# Patient Record
Sex: FEMALE | Race: WHITE | NOT HISPANIC OR LATINO | Employment: OTHER | URBAN - METROPOLITAN AREA
[De-identification: names, ages, dates, MRNs, and addresses within clinical notes are randomized per-mention and may not be internally consistent; named-entity substitution may affect disease eponyms.]

---

## 2024-03-28 LAB
CHOLESTEROL (EXTERNAL): 239 MG/DL (ref 0–200)
CREATININE (EXTERNAL): 0.8 MG/DL (ref 0.7–1.4)
GFR ESTIMATED (EXTERNAL): 69 ML/MIN/1.73M2
GLUCOSE (EXTERNAL): 99 MG/DL (ref 60–99)
HDLC SERPL-MCNC: 154 MG/DL (ref 35–65)
LDL CHOLESTEROL CALCULATED (EXTERNAL): 61 MG/DL (ref 0–130)
POTASSIUM (EXTERNAL): 5.2 MMOL/L (ref 3.5–5.1)
TRIGLYCERIDES (EXTERNAL): 120 MG/DL (ref 0–200)

## 2024-04-16 ENCOUNTER — TRANSFERRED RECORDS (OUTPATIENT)
Dept: HEALTH INFORMATION MANAGEMENT | Facility: CLINIC | Age: 79
End: 2024-04-16
Payer: COMMERCIAL

## 2024-04-16 LAB
ALT SERPL-CCNC: 18 U/L
AST SERPL-CCNC: 31 U/L (ref 14–36)
CREATININE (EXTERNAL): 0.8 MG/DL (ref 0.7–1.4)
GFR ESTIMATED (EXTERNAL): 69 ML/MIN/1.73M2
GLUCOSE (EXTERNAL): 109 MG/DL (ref 60–99)
POTASSIUM (EXTERNAL): 4.6 MMOL/L (ref 3.5–5.1)

## 2024-04-29 ENCOUNTER — TRANSFERRED RECORDS (OUTPATIENT)
Dept: HEALTH INFORMATION MANAGEMENT | Facility: CLINIC | Age: 79
End: 2024-04-29
Payer: COMMERCIAL

## 2024-05-14 ENCOUNTER — TRANSFERRED RECORDS (OUTPATIENT)
Dept: HEALTH INFORMATION MANAGEMENT | Facility: CLINIC | Age: 79
End: 2024-05-14
Payer: COMMERCIAL

## 2024-05-14 ENCOUNTER — MEDICAL CORRESPONDENCE (OUTPATIENT)
Dept: HEALTH INFORMATION MANAGEMENT | Facility: CLINIC | Age: 79
End: 2024-05-14
Payer: COMMERCIAL

## 2024-05-15 ENCOUNTER — TRANSCRIBE ORDERS (OUTPATIENT)
Dept: OTHER | Age: 79
End: 2024-05-15

## 2024-05-15 ENCOUNTER — PATIENT OUTREACH (OUTPATIENT)
Dept: ONCOLOGY | Facility: CLINIC | Age: 79
End: 2024-05-15
Payer: COMMERCIAL

## 2024-05-15 DIAGNOSIS — R22.30: Primary | ICD-10-CM

## 2024-05-15 DIAGNOSIS — C80.1 MALIGNANT SPINDLE CELL NEOPLASM (H): Primary | ICD-10-CM

## 2024-05-15 NOTE — PROGRESS NOTES
New Patient Hematology / Oncology Nurse Navigator Note     Referral Date: 5/15/24    Referring provider: Dr Kwan    Referring Clinic/Organization: [facility]     Referred to: Surgical Oncology and Medical Oncology    Requested provider (if applicable): Dr. 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 Looking at some virtual options and message sent to Dr Anna with hold placed on 5/28.       Records Location: Faxed - Media tab/Scanned     Records Needed:     See Pre-Visit encounter from CSS team for additional details on records collection. Additional questions on records needed for initial consult can be sent to P ONC ADULT NEW PATIENT RECORDS [[id number]] with a copy to  CANCER CARE NURSE NAVIGATION [[id number]]. Please include diagnosis and urgency in subject line.    Additional testing needed prior to consult:     Imaging likely needed, message to Dr Anna for orders    Referral updates and Plan:     Rosio is aware that we will begin coordinating the orders needed and have our intake team reach out to her with appointments.      Yara De Leon, NILTONN, RN, PHN, OCN  Hematology/Oncology Nurse Navigator   [facility]   [phone]   [email or web address]

## 2024-05-16 DIAGNOSIS — C80.1 MALIGNANT SPINDLE CELL NEOPLASM (H): Primary | ICD-10-CM

## 2024-05-16 DIAGNOSIS — C49.12 MALIGNANT NEOPLASM OF CONNECTIVE AND SOFT TISSUE OF LEFT UPPER LIMB, INCLUDING SHOULDER (H): ICD-10-CM

## 2024-05-23 ENCOUNTER — HOSPITAL ENCOUNTER (OUTPATIENT)
Dept: PET IMAGING | Facility: HOSPITAL | Age: 79
Discharge: HOME | End: 2024-05-23
Attending: INTERNAL MEDICINE
Payer: MEDICARE

## 2024-05-23 DIAGNOSIS — C80.1 MALIGNANT SPINDLE CELL NEOPLASM (H): ICD-10-CM

## 2024-05-23 DIAGNOSIS — C49.12 MALIGNANT NEOPLASM OF CONNECTIVE AND SOFT TISSUE OF LEFT UPPER LIMB, INCLUDING SHOULDER (H): ICD-10-CM

## 2024-05-23 LAB
CREAT BLD-MCNC: 0.8 MG/DL (ref 0.6–1.1)
EGFRCR SERPLBLD CKD-EPI 2021: >60 ML/MIN/1.73M2
GLUCOSE BLDC GLUCOMTR-MCNC: 96 MG/DL (ref 70–99)

## 2024-05-23 PROCEDURE — 82565 ASSAY OF CREATININE: CPT

## 2024-05-23 PROCEDURE — 71260 CT THORAX DX C+: CPT

## 2024-05-23 PROCEDURE — 78816 PET IMAGE W/CT FULL BODY: CPT | Mod: MG,PI

## 2024-05-23 PROCEDURE — 250N000011 HC RX IP 250 OP 636: Performed by: INTERNAL MEDICINE

## 2024-05-23 PROCEDURE — 82962 GLUCOSE BLOOD TEST: CPT

## 2024-05-23 PROCEDURE — 343N000001 HC RX 343: Performed by: INTERNAL MEDICINE

## 2024-05-23 PROCEDURE — A9552 F18 FDG: HCPCS | Performed by: INTERNAL MEDICINE

## 2024-05-23 RX ORDER — IOPAMIDOL 755 MG/ML
68 INJECTION, SOLUTION INTRAVASCULAR ONCE
Status: COMPLETED | OUTPATIENT
Start: 2024-05-23 | End: 2024-05-23

## 2024-05-23 RX ORDER — FLUDEOXYGLUCOSE F 18 200 MCI/ML
7-18 INJECTION, SOLUTION INTRAVENOUS ONCE
Status: COMPLETED | OUTPATIENT
Start: 2024-05-23 | End: 2024-05-23

## 2024-05-23 RX ADMIN — IOPAMIDOL 68 ML: 755 INJECTION, SOLUTION INTRAVENOUS at 12:35

## 2024-05-23 RX ADMIN — FLUDEOXYGLUCOSE F 18 10.18 MILLICURIE: 200 INJECTION, SOLUTION INTRAVENOUS at 11:38

## 2024-06-10 NOTE — PROGRESS NOTES
"[facility] CANCER CLINIC    NEW PATIENT VISIT NOTE    PATIENT NAME: Rosio Cunha MRN # [id number]  DATE OF VISIT: Haylie 10, 2024 YOB: 1945    REFERRING PROVIDER: Referred Self, MD  No address on file    CANCER TYPE: sarcoma?       CHIEF COMPLAIN        HISTORY OF PRESENT ILLNESS        PAST ONCOLOGIC HISTORY        PAST MEDICAL HISTORY       PAST SURGICAL HISTORY        FAMILY HISTORY        ALLERGIES    Not on File       SOCIAL HISTORY     Social History     Social History Narrative    Not on file           CURRENT OUTPATIENT MEDICATIONS     No current outpatient medications on file.          REVIEW OF SYSTEMS   As above in the HPI, o/w complete 12-point ROS was negative.     PHYSICAL EXAM   There were no vitals taken for this visit.    EGOG:  GEN: NAD  HEENT: PERRL, EOMI, no icterus, injection or pallor. Oropharynx is clear.  NECK: no cervical or supraclavicular lymphadenopathy  LUNGS: clear bilaterally  CV: regular, no murmurs, rubs, or gallops  ABDOMEN: soft, non-tender, non-distended, normal bowel sounds, no hepatosplenomegaly by percussion or palpation  EXT: warm, well perfused, no edema  NEURO: alert  SKIN: no rashes     LABORATORY AND IMAGING STUDIES     Recent Labs   Lab Test 05/23/24  1137 05/23/24  1130   CR 0.8  --    GLC  --  96     No results for input(s): \"WBC\", \"HGB\", \"PLT\", \"MCV\", \"NEUTROPHIL\" in the last 00800 hours.  No results for input(s): \"BILITOTAL\", \"ALKPHOS\", \"ALT\", \"AST\", \"ALBUMIN\", \"LDH\" in the last 24463 hours.  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   COMPARISON: None available at time of dictation  CONTRAST: 68 mm Isovue-370  TECHNIQUE: Serum glucose level 96 mg/dL. One hour post intravenous administration of 10.2 mCi F-18 FDG, PET imaging was performed from the skull vertex to feet, utilizing attenuation correction with concurrent axial CT and PET/CT image fusion. Separate   diagnostic CT of the chest, abdomen, and pelvis was performed. Dose reduction techniques were used.    PET/CT FINDINGS: Mildly FDG avid soft tissue thickening in the subcutaneous tissues of the right proximal upper extremity (Max SUV 3.1). The remaining FDG uptake is physiologic from the skull vertex to feet.    CT FINDINGS: Mild-to-moderate senescent intracranial changes. Postoperative changes of bilateral lenses. Mild paranasal sinus mucosal thickening. The paranasal tract and neck soft tissues are unremarkable. Four-vessel aortic arch. Moderate coronary   artery calcium. Visualized lungs are clear. Small fat-containing umbilical hernia. Sigmoid diverticulosis. Fibroid uterus. Pelvic phleboliths. Multilevel degenerative changes of the spine. The lower extremities are unremarkable      Impression    IMPRESSION:    Mildly FDG avid soft tissue thickening in the subcutaneous tissues of the right proximal upper extremity. While favored to represent post-excisional inflammatory change, the presence of in-situ neoplasm cannot be excluded           PATHOLOGY       I reviewed the medical records including ***, laboratory studies which are notable for ***, and have personally reviewed imaging including *** which demonstrates ***.   After an informed discussion, we have decided to proceed with ***.      ASSESSMENT AND PLAN           *** minutes spent on the date of the encounter doing {2021 E&M time in:288256}     Bala Rg MD   of Medicine  Hematology, Oncology and Transplantation

## 2024-06-10 NOTE — TELEPHONE ENCOUNTER
RECORDS STATUS - ALL OTHER DIAGNOSIS      RECORDS RECEIVED FROM: [facility], [facility], [facility], [facility]   NOTES STATUS DETAILS   OFFICE NOTE from referring provider External: [facility] Almendarez 05/14/24: Dr. Jerry Kwan   OPERATIVE REPORT External: [facility] 04/29/24: Excision mass right upper arm   MEDICATION LIST External: Jordan Almendarez    LABS     PATHOLOGY REPORTS Req 06/10-[facility]:  04/29/24: [id number]   ANYTHING RELATED TO DIAGNOSIS External: Jordan Almendarez Most recent 04/16/24   PATHOLOGY [facility] TRACKING   [facility] DIAG TRACKING #: [id number]   IMAGING (NEED IMAGES & REPORT)     CT SCANS PACS 05/23/24: CT CAP   MRI PACS 05/23/24: PET Onc Whole Body

## 2024-06-11 ENCOUNTER — LAB (OUTPATIENT)
Dept: LAB | Facility: CLINIC | Age: 79
End: 2024-06-11
Attending: STUDENT IN AN ORGANIZED HEALTH CARE EDUCATION/TRAINING PROGRAM
Payer: COMMERCIAL

## 2024-06-11 ENCOUNTER — PRE VISIT (OUTPATIENT)
Dept: ONCOLOGY | Facility: CLINIC | Age: 79
End: 2024-06-11
Payer: COMMERCIAL

## 2024-06-11 ENCOUNTER — ONCOLOGY VISIT (OUTPATIENT)
Dept: ONCOLOGY | Facility: CLINIC | Age: 79
End: 2024-06-11
Attending: STUDENT IN AN ORGANIZED HEALTH CARE EDUCATION/TRAINING PROGRAM
Payer: MEDICARE

## 2024-06-11 VITALS
WEIGHT: 128.7 LBS | RESPIRATION RATE: 16 BRPM | BODY MASS INDEX: 23.68 KG/M2 | OXYGEN SATURATION: 97 % | TEMPERATURE: 98 F | HEART RATE: 72 BPM | DIASTOLIC BLOOD PRESSURE: 90 MMHG | HEIGHT: 62 IN | SYSTOLIC BLOOD PRESSURE: 172 MMHG

## 2024-06-11 DIAGNOSIS — C80.1 MALIGNANT SPINDLE CELL NEOPLASM (H): ICD-10-CM

## 2024-06-11 LAB
ALBUMIN SERPL BCG-MCNC: 4.9 G/DL (ref 3.5–5.2)
ALP SERPL-CCNC: 78 U/L (ref 40–150)
ALT SERPL W P-5'-P-CCNC: 17 U/L (ref 0–50)
ANION GAP SERPL CALCULATED.3IONS-SCNC: 10 MMOL/L (ref 7–15)
AST SERPL W P-5'-P-CCNC: 25 U/L (ref 0–45)
BASOPHILS # BLD AUTO: 0.1 10E3/UL (ref 0–0.2)
BASOPHILS NFR BLD AUTO: 1 %
BILIRUB SERPL-MCNC: 0.7 MG/DL
BUN SERPL-MCNC: 15.6 MG/DL (ref 8–23)
CALCIUM SERPL-MCNC: 10.1 MG/DL (ref 8.8–10.2)
CHLORIDE SERPL-SCNC: 104 MMOL/L (ref 98–107)
CREAT SERPL-MCNC: 0.81 MG/DL (ref 0.51–0.95)
DEPRECATED HCO3 PLAS-SCNC: 26 MMOL/L (ref 22–29)
EGFRCR SERPLBLD CKD-EPI 2021: 73 ML/MIN/1.73M2
EOSINOPHIL # BLD AUTO: 0.1 10E3/UL (ref 0–0.7)
EOSINOPHIL NFR BLD AUTO: 1 %
ERYTHROCYTE [DISTWIDTH] IN BLOOD BY AUTOMATED COUNT: 13.5 % (ref 10–15)
GLUCOSE SERPL-MCNC: 106 MG/DL (ref 70–99)
HCT VFR BLD AUTO: 37.6 % (ref 35–47)
HGB BLD-MCNC: 12.5 G/DL (ref 11.7–15.7)
IMM GRANULOCYTES # BLD: 0 10E3/UL
IMM GRANULOCYTES NFR BLD: 0 %
LYMPHOCYTES # BLD AUTO: 1.7 10E3/UL (ref 0.8–5.3)
LYMPHOCYTES NFR BLD AUTO: 26 %
MCH RBC QN AUTO: 30.5 PG (ref 26.5–33)
MCHC RBC AUTO-ENTMCNC: 33.2 G/DL (ref 31.5–36.5)
MCV RBC AUTO: 92 FL (ref 78–100)
MONOCYTES # BLD AUTO: 0.6 10E3/UL (ref 0–1.3)
MONOCYTES NFR BLD AUTO: 9 %
NEUTROPHILS # BLD AUTO: 4.1 10E3/UL (ref 1.6–8.3)
NEUTROPHILS NFR BLD AUTO: 63 %
NRBC # BLD AUTO: 0 10E3/UL
NRBC BLD AUTO-RTO: 0 /100
PLATELET # BLD AUTO: 310 10E3/UL (ref 150–450)
POTASSIUM SERPL-SCNC: 4.1 MMOL/L (ref 3.4–5.3)
PROT SERPL-MCNC: 7.2 G/DL (ref 6.4–8.3)
RBC # BLD AUTO: 4.1 10E6/UL (ref 3.8–5.2)
SODIUM SERPL-SCNC: 140 MMOL/L (ref 135–145)
WBC # BLD AUTO: 6.5 10E3/UL (ref 4–11)

## 2024-06-11 PROCEDURE — 82040 ASSAY OF SERUM ALBUMIN: CPT

## 2024-06-11 PROCEDURE — 99205 OFFICE O/P NEW HI 60 MIN: CPT | Performed by: STUDENT IN AN ORGANIZED HEALTH CARE EDUCATION/TRAINING PROGRAM

## 2024-06-11 PROCEDURE — 36415 COLL VENOUS BLD VENIPUNCTURE: CPT

## 2024-06-11 PROCEDURE — G0463 HOSPITAL OUTPT CLINIC VISIT: HCPCS | Performed by: STUDENT IN AN ORGANIZED HEALTH CARE EDUCATION/TRAINING PROGRAM

## 2024-06-11 PROCEDURE — 85025 COMPLETE CBC W/AUTO DIFF WBC: CPT

## 2024-06-11 ASSESSMENT — PAIN SCALES - GENERAL: PAINLEVEL: NO PAIN (0)

## 2024-06-11 NOTE — NURSING NOTE
"Oncology Rooming Note    June 11, 2024 1:55 PM   Rosio Cunha is a 79 year old female who presents for:    Chief Complaint   Patient presents with    Oncology Clinic Visit     Malignant spindle cell neoplasm     Initial Vitals: BP (!) 172/90 (BP Location: Right arm, Patient Position: Sitting, Cuff Size: Adult Regular)   Pulse 72   Temp 98  F (36.7  C) (Oral)   Resp 16   Ht 1.573 m (5' 1.93\")   Wt 58.4 kg (128 lb 11.2 oz)   SpO2 97%   BMI 23.59 kg/m   Estimated body mass index is 23.59 kg/m  as calculated from the following:    Height as of this encounter: 1.573 m (5' 1.93\").    Weight as of this encounter: 58.4 kg (128 lb 11.2 oz). Body surface area is 1.6 meters squared.  No Pain (0) Comment: Data Unavailable   No LMP recorded. Patient is postmenopausal.  Allergies reviewed: Yes  Medications reviewed: Yes    Medications: Medication refills not needed today.  Pharmacy name entered into [employer]: [facility] [address] [facility] [address] [facility] [employer]    Frailty Screening:   Is the patient here for a new oncology consult visit in cancer care? 1. Yes. Over the past month, have you experienced difficulty or required a caregiver to assist with:   1. Balance, walking or general mobility (including any falls)? NO  2. Completion of self-care tasks such as bathing, dressing, toileting, grooming/hygiene?  NO  3. Concentration or memory that affects your daily life?  NO       Clinical concerns: none.       Cesar Reyes"

## 2024-06-11 NOTE — LETTER
"2024      Rosio Cunha  [address]  [place] [address]      Dear Colleague,    Thank you for referring your patient, Rosio Cunha, to the [facility]. Please see a copy of my visit note below.    [facility]    NEW PATIENT VISIT NOTE    PATIENT NAME: Rosio Cunha MRN # [id number]  DATE OF VISIT: 2024 YOB: 1945    REFERRING PROVIDER: Referred Self, MD  No address on file    CANCER TYPE: pending biopsy       CHIEF COMPLAIN   Healing arm surgical scan     HISTORY OF PRESENT ILLNESS   78 yo with no significant PMH. She reports that about 2 years she felt a small bump at the upper arm, she brought it up to her PCP and she was told that it was a cyst, it was originally removed on . It continued to grow and she underwent resection again in . The pathology was read as atypical spindle cell neoplasm extending to the borders and per report it was sent to [facility] for further characterization. I do not have the result from [place].     She comes here for further management. She has recovered well from surgery      PAST ONCOLOGIC HISTORY   None     PAST MEDICAL HISTORY   None    PAST SURGICAL HISTORY   None     FAMILY HISTORY   None     ALLERGIES    No Known Allergies       SOCIAL HISTORY     Social History     Social History Narrative    Not on file   Lives by herslef, has 3 children and all live nearby, walks every day,.  Comes with grandkae Koehler      CURRENT OUTPATIENT MEDICATIONS     No current outpatient medications on file.        REVIEW OF SYSTEMS   As above in the HPI, o/w complete 12-point ROS was negative.     PHYSICAL EXAM   BP (!) 172/90 (BP Location: Right arm, Patient Position: Sitting, Cuff Size: Adult Regular)   Pulse 72   Temp 98  F (36.7  C) (Oral)   Resp 16   Ht 1.573 m (5' 1.93\")   Wt 58.4 kg (128 lb 11.2 oz)   SpO2 97%   BMI 23.59 kg/m      EGO  GEN: NAD  HEENT: NIEVES MAURO, " no icterus, injection or pallor. Oropharynx is clear.  NECK: no cervical or supraclavicular lymphadenopathy  LUNGS: clear bilaterally  CV: regular, no murmurs, rubs, or gallops  ABDOMEN: soft, non-tender, non-distended, normal bowel sounds, no hepatosplenomegaly by percussion or palpation  EXT: warm, well perfused, no edema. R arm with an 8 cm surgical scar, well-healed.   NEURO: alert  SKIN: no rashes     LABORATORY AND IMAGING STUDIES     Lab Results   Component Value Date    WBC 6.5 06/11/2024     Lab Results   Component Value Date    RBC 4.10 06/11/2024     Lab Results   Component Value Date    HGB 12.5 06/11/2024     Lab Results   Component Value Date    HCT 37.6 06/11/2024     Lab Results   Component Value Date    MCV 92 06/11/2024     Lab Results   Component Value Date    MCH 30.5 06/11/2024     Lab Results   Component Value Date    MCHC 33.2 06/11/2024     Lab Results   Component Value Date    RDW 13.5 06/11/2024     Lab Results   Component Value Date     06/11/2024     Last Comprehensive Metabolic Panel:  Sodium   Date Value Ref Range Status   06/11/2024 140 135 - 145 mmol/L Final     Comment:     Reference intervals for this test were updated on 09/26/2023 to more accurately reflect our healthy population. There may be differences in the flagging of prior results with similar values performed with this method. Interpretation of those prior results can be made in the context of the updated reference intervals.      Potassium   Date Value Ref Range Status   06/11/2024 4.1 3.4 - 5.3 mmol/L Final     Chloride   Date Value Ref Range Status   06/11/2024 104 98 - 107 mmol/L Final     Carbon Dioxide (CO2)   Date Value Ref Range Status   06/11/2024 26 22 - 29 mmol/L Final     Anion Gap   Date Value Ref Range Status   06/11/2024 10 7 - 15 mmol/L Final     Glucose   Date Value Ref Range Status   06/11/2024 106 (H) 70 - 99 mg/dL Final     GLUCOSE BY METER POCT   Date Value Ref Range Status   05/23/2024 96 70 -  99 mg/dL Final     Urea Nitrogen   Date Value Ref Range Status   06/11/2024 15.6 8.0 - 23.0 mg/dL Final     Creatinine   Date Value Ref Range Status   06/11/2024 0.81 0.51 - 0.95 mg/dL Final     GFR Estimate   Date Value Ref Range Status   06/11/2024 73 >60 mL/min/1.73m2 Final     GFR, ESTIMATED POCT   Date Value Ref Range Status   05/23/2024 >60 >60 mL/min/1.73m2 Final     Calcium   Date Value Ref Range Status   06/11/2024 10.1 8.8 - 10.2 mg/dL Final     Bilirubin Total   Date Value Ref Range Status   06/11/2024 0.7 <=1.2 mg/dL Final     Alkaline Phosphatase   Date Value Ref Range Status   06/11/2024 78 40 - 150 U/L Final     ALT   Date Value Ref Range Status   06/11/2024 17 0 - 50 U/L Final     Comment:     Reference intervals for this test were updated on 6/12/2023 to more accurately reflect our healthy population. There may be differences in the flagging of prior results with similar values performed with this method. Interpretation of those prior results can be made in the context of the updated reference intervals.       AST   Date Value Ref Range Status   06/11/2024 25 0 - 45 U/L Final     Comment:     Reference intervals for this test were updated on 6/12/2023 to more accurately reflect our healthy population. There may be differences in the flagging of prior results with similar values performed with this method. Interpretation of those prior results can be made in the context of the updated reference intervals.       Results for orders placed or performed during the hospital encounter of 05/23/24   CT Chest/Abdomen/Pelvis w Contrast    Narrative    EXAM: PET ONCOLOGY WHOLE BODY, CT CHEST/ABDOMEN/PELVIS W CONTRAST  LOCATION: [facility]  DATE: 5/23/2024    INDICATION: Initial treatment planning and restaging for malignant neoplasm of connective and soft tissue of right upper limb, including shoulder.   COMPARISON: None available at time of dictation  CONTRAST: 68 mm  Isovue-370  TECHNIQUE: Serum glucose level 96 mg/dL. One hour post intravenous administration of 10.2 mCi F-18 FDG, PET imaging was performed from the skull vertex to feet, utilizing attenuation correction with concurrent axial CT and PET/CT image fusion. Separate   diagnostic CT of the chest, abdomen, and pelvis was performed. Dose reduction techniques were used.    PET/CT FINDINGS: Mildly FDG avid soft tissue thickening in the subcutaneous tissues of the right proximal upper extremity (Max SUV 3.1). The remaining FDG uptake is physiologic from the skull vertex to feet.    CT FINDINGS: Mild-to-moderate senescent intracranial changes. Postoperative changes of bilateral lenses. Mild paranasal sinus mucosal thickening. The paranasal tract and neck soft tissues are unremarkable. Four-vessel aortic arch. Moderate coronary   artery calcium. Visualized lungs are clear. Small fat-containing umbilical hernia. Sigmoid diverticulosis. Fibroid uterus. Pelvic phleboliths. Multilevel degenerative changes of the spine. The lower extremities are unremarkable      Impression    IMPRESSION:    Mildly FDG avid soft tissue thickening in the subcutaneous tissues of the right proximal upper extremity. 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    I reviewed the medical records including prior medical notes, laboratory studies which are notable for adequate renal and hepatic function, and normal blood counts. I have personally reviewed imaging including the most recent PET scan which demonstrates slight increase in FDG uptake at the site of the surgery, no clear evidence of gross residual tumor and no other concerning lesions.         ASSESSMENT AND PLAN     80 yo with no significant PMH. She reports that about 2 years she felt a small bump at the upper arm, she  brought it up to her PCP and she was told that it was a cyst, it was originally removed on 2019. It continued to grow and she underwent resection again in 4/292024. The pathology was read as atypical spindle cell neoplasm extending to the borders and per report it was sent to [facility] for further characterization. I do not have the result from [place].     I explained that we need to await for the final pathology result and our in house review in order to give further recommendations. I explained that in any case, she has localized disease and most likely will need re-resection. She will meet with Dr. Gillis in 2 days.   Pathology review has been requested.    Plan:  -Follow up Dr. Contreras in 2 weeks       60 minutes spent on the date of the encounter doing chart review, review of outside records, review of test results, interpretation of tests, patient visit, documentation, and discussion with other provider(s)     Bala Rg MD   of Medicine  Hematology, Oncology and Transplantation

## 2024-06-11 NOTE — PROGRESS NOTES
"[facility] CANCER CLINIC    NEW PATIENT VISIT NOTE    PATIENT NAME: Rosio Cunha MRN # [id number]  DATE OF VISIT: 2024 YOB: 1945    REFERRING PROVIDER: Referred Self, MD  No address on file    CANCER TYPE: pending biopsy       CHIEF COMPLAIN   Healing arm surgical scan     HISTORY OF PRESENT ILLNESS   78 yo with no significant PMH. She reports that about 2 years she felt a small bump at the upper arm, she brought it up to her PCP and she was told that it was a cyst, it was originally removed on 2019. It continued to grow and she underwent resection again in . 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 I do not have the result from [place].     She comes here for further management. She has recovered well from surgery      PAST ONCOLOGIC HISTORY   None     PAST MEDICAL HISTORY   None    PAST SURGICAL HISTORY   None     FAMILY HISTORY   None     ALLERGIES    No Known Allergies       SOCIAL HISTORY     Social History     Social History Narrative    Not on file   Lives by herslef, has 3 children and all live nearby, walks every day,.  Comes with sarai Koehler      CURRENT OUTPATIENT MEDICATIONS     No current outpatient medications on file.        REVIEW OF SYSTEMS   As above in the HPI, o/w complete 12-point ROS was negative.     PHYSICAL EXAM   BP (!) 172/90 (BP Location: Right arm, Patient Position: Sitting, Cuff Size: Adult Regular)   Pulse 72   Temp 98  F (36.7  C) (Oral)   Resp 16   Ht 1.573 m (5' 1.93\")   Wt 58.4 kg (128 lb 11.2 oz)   SpO2 97%   BMI 23.59 kg/m      EGO  GEN: NAD  HEENT: PERRL, EOMI, no icterus, injection or pallor. Oropharynx is clear.  NECK: no cervical or supraclavicular lymphadenopathy  LUNGS: clear bilaterally  CV: regular, no murmurs, rubs, or gallops  ABDOMEN: soft, non-tender, non-distended, normal bowel sounds, no " hepatosplenomegaly by percussion or palpation  EXT: warm, well perfused, no edema. R arm with an 8 cm surgical scar, well-healed.   NEURO: alert  SKIN: no rashes     LABORATORY AND IMAGING STUDIES     Lab Results   Component Value Date    WBC 6.5 06/11/2024     Lab Results   Component Value Date    RBC 4.10 06/11/2024     Lab Results   Component Value Date    HGB 12.5 06/11/2024     Lab Results   Component Value Date    HCT 37.6 06/11/2024     Lab Results   Component Value Date    MCV 92 06/11/2024     Lab Results   Component Value Date    MCH 30.5 06/11/2024     Lab Results   Component Value Date    MCHC 33.2 06/11/2024     Lab Results   Component Value Date    RDW 13.5 06/11/2024     Lab Results   Component Value Date     06/11/2024     Last Comprehensive Metabolic Panel:  Sodium   Date Value Ref Range Status   06/11/2024 140 135 - 145 mmol/L Final     Comment:     Reference intervals for this test were updated on 09/26/2023 to more accurately reflect our healthy population. There may be differences in the flagging of prior results with similar values performed with this method. Interpretation of those prior results can be made in the context of the updated reference intervals.      Potassium   Date Value Ref Range Status   06/11/2024 4.1 3.4 - 5.3 mmol/L Final     Chloride   Date Value Ref Range Status   06/11/2024 104 98 - 107 mmol/L Final     Carbon Dioxide (CO2)   Date Value Ref Range Status   06/11/2024 26 22 - 29 mmol/L Final     Anion Gap   Date Value Ref Range Status   06/11/2024 10 7 - 15 mmol/L Final     Glucose   Date Value Ref Range Status   06/11/2024 106 (H) 70 - 99 mg/dL Final     GLUCOSE BY METER POCT   Date Value Ref Range Status   05/23/2024 96 70 - 99 mg/dL Final     Urea Nitrogen   Date Value Ref Range Status   06/11/2024 15.6 8.0 - 23.0 mg/dL Final     Creatinine   Date Value Ref Range Status   06/11/2024 0.81 0.51 - 0.95 mg/dL Final     GFR Estimate   Date Value Ref Range Status    06/11/2024 73 >60 mL/min/1.73m2 Final     GFR, ESTIMATED POCT   Date Value Ref Range Status   05/23/2024 >60 >60 mL/min/1.73m2 Final     Calcium   Date Value Ref Range Status   06/11/2024 10.1 8.8 - 10.2 mg/dL Final     Bilirubin Total   Date Value Ref Range Status   06/11/2024 0.7 <=1.2 mg/dL Final     Alkaline Phosphatase   Date Value Ref Range Status   06/11/2024 78 40 - 150 U/L Final     ALT   Date Value Ref Range Status   06/11/2024 17 0 - 50 U/L Final     Comment:     Reference intervals for this test were updated on 6/12/2023 to more accurately reflect our healthy population. There may be differences in the flagging of prior results with similar values performed with this method. Interpretation of those prior results can be made in the context of the updated reference intervals.       AST   Date Value Ref Range Status   06/11/2024 25 0 - 45 U/L Final     Comment:     Reference intervals for this test were updated on 6/12/2023 to more accurately reflect our healthy population. There may be differences in the flagging of prior results with similar values performed with this method. Interpretation of those prior results can be made in the context of the updated reference intervals.       Results for orders placed or performed during the hospital encounter of 05/23/24   CT Chest/Abdomen/Pelvis w Contrast    Narrative    EXAM: PET ONCOLOGY WHOLE BODY, CT CHEST/ABDOMEN/PELVIS W CONTRAST  LOCATION: 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   COMPARISON: None available at time of dictation  CONTRAST: 68 mm [id number]  TECHNIQUE: Serum glucose level 96 mg/dL. One hour post intravenous administration of 10.2 mCi F-18 FDG, PET imaging was performed from the skull vertex to feet, utilizing attenuation correction with concurrent axial CT and PET/CT image fusion.  Separate   diagnostic CT of the chest, abdomen, and pelvis was performed. Dose reduction techniques were used.    PET/CT FINDINGS: Mildly FDG avid soft tissue thickening in the subcutaneous tissues of the right proximal upper extremity (Max SUV 3.1). The remaining FDG uptake is physiologic from the skull vertex to feet.    CT FINDINGS: Mild-to-moderate senescent intracranial changes. Postoperative changes of bilateral lenses. Mild paranasal sinus mucosal thickening. The paranasal tract and neck soft tissues are unremarkable. Four-vessel aortic arch. Moderate coronary   artery calcium. Visualized lungs are clear. Small fat-containing umbilical hernia. Sigmoid diverticulosis. Fibroid uterus. Pelvic phleboliths. Multilevel degenerative changes of the spine. The lower extremities are unremarkable      Impression    IMPRESSION:    Mildly FDG avid soft tissue thickening in the subcutaneous tissues of the right proximal upper extremity. 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    I reviewed the medical records including prior medical notes, laboratory studies which are notable for adequate renal and hepatic function, and normal blood counts. I have personally reviewed imaging including the most recent PET scan which demonstrates slight increase in FDG uptake at the site of the surgery, no clear evidence of gross residual tumor and no other concerning lesions.         ASSESSMENT AND PLAN     78 yo with no significant PMH. She reports that about 2 years she felt a small bump at the upper arm, she brought it up to her PCP and she was told that it was a cyst, it was originally removed on 2019. It continued to grow and she underwent resection again in 4/292024. 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 I do not have the result from [place].     I explained that we need to await for the final pathology result and our in house review in order to give further recommendations. I explained that in any case, she has localized disease and most likely will need re-resection. She will meet with Dr. Gillis in 2 days.   Pathology review has been requested.    Plan:  -Follow up Dr. Contreras in 2 weeks       60 minutes spent on the date of the encounter doing chart review, review of outside records, review of test results, interpretation of tests, patient visit, documentation, and discussion with other provider(s)     Bala Rg MD   of Medicine  Hematology, Oncology and Transplantation

## 2024-06-14 ENCOUNTER — ONCOLOGY VISIT (OUTPATIENT)
Dept: ONCOLOGY | Facility: CLINIC | Age: 79
End: 2024-06-14
Attending: SURGERY
Payer: MEDICARE

## 2024-06-14 VITALS
WEIGHT: 129.4 LBS | TEMPERATURE: 97.7 F | RESPIRATION RATE: 16 BRPM | BODY MASS INDEX: 24.43 KG/M2 | HEART RATE: 67 BPM | DIASTOLIC BLOOD PRESSURE: 69 MMHG | OXYGEN SATURATION: 98 % | SYSTOLIC BLOOD PRESSURE: 174 MMHG | HEIGHT: 61 IN

## 2024-06-14 DIAGNOSIS — C49.12 MALIGNANT NEOPLASM OF CONNECTIVE AND SOFT TISSUE OF LEFT UPPER LIMB, INCLUDING SHOULDER (H): Primary | ICD-10-CM

## 2024-06-14 PROCEDURE — G0463 HOSPITAL OUTPT CLINIC VISIT: HCPCS | Performed by: SURGERY

## 2024-06-14 PROCEDURE — 99202 OFFICE O/P NEW SF 15 MIN: CPT | Performed by: SURGERY

## 2024-06-14 ASSESSMENT — PAIN SCALES - GENERAL: PAINLEVEL: NO PAIN (0)

## 2024-06-14 NOTE — LETTER
6/14/2024      Rosio Cunha  [address]  [facility] [place] [facility] [place] [address]      Dear Colleague,    Thank you for referring your patient, Rosio Cunha, to the [facility]. Please see a copy of my visit note below.    Patient is a 79-year-old woman who has an abnormal skin biopsy of her right shoulder.  She states that about 3 years ago she had a cyst on her right arm.  She states that she underwent an excision of that cyst in the doctor's office who threw the specimen away.  She states then at the wound never healed correctly and so she saw another surgeon in April who performed a wide excision of the previous excision site.  This specimen was sent to pathology and was reviewed and was considered an atypical spindle cell neoplasm with positive margins.  The slides have subsequently been sent to the [facility].  The results from the review are not back she was told that she had cancer and so she had a PET CT scan which did not show any abnormalities.  Other than the expected skin changes.  On physical examination she has a 8 cm incision in her right shoulder with no evidence of pigmented skin changes and no palpable masses.    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  We will await the reports from [facility].  If she does have an atypical skin lesion with positive margins that I would recommend reexcision.  Will set up a phone visit in approximately 2 weeks with her to discuss the final recommendations.    The total time was 20 minutes which included reviewing her imaging and the in person visit    Sincerely,        Moi Gillis MD

## 2024-06-14 NOTE — PROGRESS NOTES
Patient is a 79-year-old woman who has an abnormal skin biopsy of her right shoulder.  She states that about 3 years ago she had a cyst on her right arm.  She states that she underwent an excision of that cyst in the doctor's office who threw the specimen away.  She states then at the wound never healed correctly and so she saw another surgeon in April who performed a wide excision of the previous excision site.  This specimen was sent to pathology and was reviewed and was considered an atypical spindle cell neoplasm with positive margins.  The slides have subsequently been sent to the [facility].  The results from the review are not back she was told that she had cancer and so she had a PET CT scan which did not show any abnormalities.  Other than the expected skin changes.  On physical examination she has a 8 cm incision in her right shoulder with no evidence of pigmented skin changes and no palpable masses.    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  We will await the reports from [facility].  If she does have an atypical skin lesion with positive margins that I would recommend reexcision.  Will set up a phone visit in approximately 2 weeks with her to discuss the final recommendations.    The total time was 20 minutes which included reviewing her imaging and the in person visit

## 2024-06-14 NOTE — NURSING NOTE
"Oncology Rooming Note    June 14, 2024 10:12 AM   Rosio Cunha is a 79 year old female who presents for:    Chief Complaint   Patient presents with    Oncology Clinic Visit     New Eval for Malignant Spindle Cell neoplasm     Initial Vitals: BP (!) 174/69   Pulse 67   Temp 97.7  F (36.5  C) (Oral)   Resp 16   Ht 1.549 m (5' 1\")   Wt 58.7 kg (129 lb 6.4 oz)   SpO2 98%   BMI 24.45 kg/m   Estimated body mass index is 24.45 kg/m  as calculated from the following:    Height as of this encounter: 1.549 m (5' 1\").    Weight as of this encounter: 58.7 kg (129 lb 6.4 oz). Body surface area is 1.59 meters squared.  No Pain (0) Comment: Data Unavailable   No LMP recorded. Patient is postmenopausal.  Allergies reviewed: Yes  Medications reviewed: Yes    Medications: Medication refills not needed today.  Pharmacy name entered into [employer]: [facility] [address] [facility] [address]    Frailty Screening:   Is the patient here for a new oncology consult visit in cancer care? 2. No      Clinical concerns:        Jyoti Kemp MA             "

## 2024-06-18 NOTE — TELEPHONE ENCOUNTER
Action    Action Taken 6/18/24  Slides from [employer] received, sent to 5th floor lab @ [facility]  12:00 PM

## 2024-06-20 ENCOUNTER — LAB REQUISITION (OUTPATIENT)
Dept: LAB | Facility: CLINIC | Age: 79
End: 2024-06-20
Payer: COMMERCIAL

## 2024-06-20 LAB
PATH REPORT.COMMENTS IMP SPEC: ABNORMAL
PATH REPORT.COMMENTS IMP SPEC: YES
PATH REPORT.FINAL DX SPEC: ABNORMAL
PATH REPORT.GROSS SPEC: ABNORMAL
PATH REPORT.MICROSCOPIC SPEC OTHER STN: ABNORMAL
PATH REPORT.RELEVANT HX SPEC: ABNORMAL
PATH REPORT.RELEVANT HX SPEC: ABNORMAL
PATH REPORT.SITE OF ORIGIN SPEC: ABNORMAL

## 2024-06-20 PROCEDURE — 88321 CONSLTJ&REPRT SLD PREP ELSWR: CPT | Performed by: DERMATOLOGY

## 2024-06-24 NOTE — PROGRESS NOTES
Virtual Visit Details    Type of service:  Video Visit   Video Start Time: 5:30 PM  Video End Time:5:52 PM    Originating Location (pt. Location): Home    Distant Location (provider location):  On-site  Platform used for Video Visit: [facility] CANCER CLINIC    FOLLOW UP VISIT NOTE    PATIENT NAME: Rosio Cunha MRN # [id number]  DATE OF VISIT: 2024 YOB: 1945    REFERRING PROVIDER: Referred Self, MD  No address on file    CANCER TYPE:   High grade leiomyosarcoma       CHIEF COMPLAIN   Healing arm surgical scan     HISTORY OF PRESENT ILLNESS   80 yo with no significant PMH. She reports that about 2 years she felt a small bump at the upper arm, she brought it up to her PCP and she was told that it was a cyst, it was originally removed on . It continued to grow and she underwent resection again in . 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 I do not have the result from [place].     She comes here for further management. She has recovered well from surgery     Interval Hx  She feels well, no new symptoms     PAST ONCOLOGIC HISTORY   None     PAST MEDICAL HISTORY   None    PAST SURGICAL HISTORY   None     FAMILY HISTORY   None     ALLERGIES    No Known Allergies       SOCIAL HISTORY     Social History     Social History Narrative    Not on file   Lives by herslef, has 3 children and all live nearby, walks every day,.  Comes with grandkae Koehler      CURRENT OUTPATIENT MEDICATIONS     No current outpatient medications on file.        REVIEW OF SYSTEMS   As above in the HPI, o/w complete 12-point ROS was negative.     PHYSICAL EXAM   There were no vitals taken for this visit.    Virtual visit only    Prior medical exam    EGO  GEN: NAD  HEENT: PERRL, EOMI, no icterus, injection or pallor. Oropharynx is clear.  NECK: no cervical or  supraclavicular lymphadenopathy  LUNGS: clear bilaterally  CV: regular, no murmurs, rubs, or gallops  ABDOMEN: soft, non-tender, non-distended, normal bowel sounds, no hepatosplenomegaly by percussion or palpation  EXT: warm, well perfused, no edema. R arm with an 8 cm surgical scar, well-healed.   NEURO: alert  SKIN: no rashes     LABORATORY AND IMAGING STUDIES     Lab Results   Component Value Date    WBC 6.5 06/11/2024     Lab Results   Component Value Date    RBC 4.10 06/11/2024     Lab Results   Component Value Date    HGB 12.5 06/11/2024     Lab Results   Component Value Date    HCT 37.6 06/11/2024     Lab Results   Component Value Date    MCV 92 06/11/2024     Lab Results   Component Value Date    MCH 30.5 06/11/2024     Lab Results   Component Value Date    MCHC 33.2 06/11/2024     Lab Results   Component Value Date    RDW 13.5 06/11/2024     Lab Results   Component Value Date     06/11/2024     Last Comprehensive Metabolic Panel:  Sodium   Date Value Ref Range Status   06/11/2024 140 135 - 145 mmol/L Final     Comment:     Reference intervals for this test were updated on 09/26/2023 to more accurately reflect our healthy population. There may be differences in the flagging of prior results with similar values performed with this method. Interpretation of those prior results can be made in the context of the updated reference intervals.      Potassium   Date Value Ref Range Status   06/11/2024 4.1 3.4 - 5.3 mmol/L Final     Chloride   Date Value Ref Range Status   06/11/2024 104 98 - 107 mmol/L Final     Carbon Dioxide (CO2)   Date Value Ref Range Status   06/11/2024 26 22 - 29 mmol/L Final     Anion Gap   Date Value Ref Range Status   06/11/2024 10 7 - 15 mmol/L Final     Glucose   Date Value Ref Range Status   06/11/2024 106 (H) 70 - 99 mg/dL Final     GLUCOSE BY METER POCT   Date Value Ref Range Status   05/23/2024 96 70 - 99 mg/dL Final     Urea Nitrogen   Date Value Ref Range Status   06/11/2024  15.6 8.0 - 23.0 mg/dL Final     Creatinine   Date Value Ref Range Status   06/11/2024 0.81 0.51 - 0.95 mg/dL Final     GFR Estimate   Date Value Ref Range Status   06/11/2024 73 >60 mL/min/1.73m2 Final     GFR, ESTIMATED POCT   Date Value Ref Range Status   05/23/2024 >60 >60 mL/min/1.73m2 Final     Calcium   Date Value Ref Range Status   06/11/2024 10.1 8.8 - 10.2 mg/dL Final     Bilirubin Total   Date Value Ref Range Status   06/11/2024 0.7 <=1.2 mg/dL Final     Alkaline Phosphatase   Date Value Ref Range Status   06/11/2024 78 40 - 150 U/L Final     ALT   Date Value Ref Range Status   06/11/2024 17 0 - 50 U/L Final     Comment:     Reference intervals for this test were updated on 6/12/2023 to more accurately reflect our healthy population. There may be differences in the flagging of prior results with similar values performed with this method. Interpretation of those prior results can be made in the context of the updated reference intervals.       AST   Date Value Ref Range Status   06/11/2024 25 0 - 45 U/L Final     Comment:     Reference intervals for this test were updated on 6/12/2023 to more accurately reflect our healthy population. There may be differences in the flagging of prior results with similar values performed with this method. Interpretation of those prior results can be made in the context of the updated reference intervals.       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   COMPARISON: None available at time of dictation  CONTRAST: 68 mm Isovue-370  TECHNIQUE: Serum glucose level 96 mg/dL. One hour post intravenous administration  of 10.2 mCi F-18 FDG, PET imaging was performed from the skull vertex to feet, utilizing attenuation correction with concurrent axial CT and PET/CT image fusion. Separate   diagnostic CT of the chest, abdomen, and pelvis was performed. Dose reduction techniques were used.    PET/CT FINDINGS: Mildly FDG avid soft tissue thickening in the subcutaneous tissues of the right proximal upper extremity (Max SUV 3.1). The remaining FDG uptake is physiologic from the skull vertex to feet.    CT FINDINGS: Mild-to-moderate senescent intracranial changes. Postoperative changes of bilateral lenses. Mild paranasal sinus mucosal thickening. The paranasal tract and neck soft tissues are unremarkable. Four-vessel aortic arch. Moderate coronary   artery calcium. Visualized lungs are clear. Small fat-containing umbilical hernia. Sigmoid diverticulosis. Fibroid uterus. Pelvic phleboliths. Multilevel degenerative changes of the spine. The lower extremities are unremarkable      Impression    IMPRESSION:    Mildly FDG avid soft tissue thickening in the subcutaneous tissues of the right proximal upper extremity. While favored to represent post-excisional inflammatory change, the presence of in-situ neoplasm cannot be excluded           PATHOLOGY     Final Diagnosis   [place] FROM [place] ([id number], OBTAINED 04/29/2024):  Skin, right arm, excision:  - Malignant spindle cell neoplasm, extending to the deep margin - (see comment and description)   Electronically signed by Jacky Mcclain MD on 6/20/2024 at  1:59 PM   Comment    This case was reviewed in conjunction with review of the report from Dr. Gerald Rowe at [facility].  I agree with the interpretation this neoplasm represents a dual phase malignant neoplasm which superficially resembles a cutaneous atypical smooth muscle neoplasm (previously referred to as cutaneous leiomyosarcoma) which devolves in the deeper aspects of the specimen into a poorly  differentiated leiomyosarcoma with zones of anaplastic growth.  While cutaneous atypical smooth muscle neoplasm has been documented to have generally indolent clinical behavior, I share concerns that the dedifferentiated and anaplastic areas of this tumor make aggressive growth significantly more likely.  As this lesion extends to the deep specimen margin, a reexcision is recommended at this site (alongside additional workup/staging).     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    I reviewed the medical records including prior medical notes, laboratory studies which are notable for adequate renal and hepatic function, and normal blood counts. I have personally reviewed imaging including the most recent PET scan which demonstrates slight increase in FDG uptake at the site of the surgery, no clear evidence of gross residual tumor and no other concerning lesions.         ASSESSMENT AND PLAN     80 yo with no significant PMH. She reports that about 2 years she felt a small bump at the upper arm, she brought it up to her PCP and she was told that it was a cyst, it was originally removed on 2019. It continued to grow and she underwent resection again in 4/292024. 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 I do not have the result from [place]. The full specimen including the skin was up to 5.5 cm, but no record of the size of the tumor in the pathology reports.    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     I discussed with the patient that sarcomas are rare tumors only representing 1% of all cancers and that they are very heterogeneous with over 100 different subtypes. When sarcomas are localized disease they main approach to therapy is surgical resection. However, there are factors  that influence the chances of recurrence and development of distant metastasis. In general tumors that are big in size (over 5 cm) and high grade (rapid cell division observed under the microscope) have higher probability of recurrence.     In her case, the tumor seems to be small, and currently there is no measurable disease. I recommend to proceed with re-resection, followed by close surveillance. She is meeting with Dr. Gillis tomorrow.     I explained that she will need local imaging and CT chest every 3-4 months during the first few years after resection.     She had a lot of trouble understanding my accent during the virtual visit and I had to type on the chat most of our conversation. I will request transfer care to my colleague Dr. Anna for surveillance.       Plan:  - MRI R arm and CT chest at the end of september  - follow up Dr Anna after scans      40 minutes spent on the date of the encounter doing chart review, review of outside records, review of test results, interpretation of tests, patient visit, documentation, and discussion with other provider(s)     Bala Rg MD   of Medicine  Hematology, Oncology and Transplantation

## 2024-06-26 ENCOUNTER — VIRTUAL VISIT (OUTPATIENT)
Dept: ONCOLOGY | Facility: CLINIC | Age: 79
End: 2024-06-26
Attending: STUDENT IN AN ORGANIZED HEALTH CARE EDUCATION/TRAINING PROGRAM
Payer: COMMERCIAL

## 2024-06-26 DIAGNOSIS — C49.9 LEIOMYOSARCOMA (H): Primary | ICD-10-CM

## 2024-06-26 DIAGNOSIS — C80.1 MALIGNANT SPINDLE CELL NEOPLASM (H): ICD-10-CM

## 2024-06-26 PROCEDURE — 99215 OFFICE O/P EST HI 40 MIN: CPT | Mod: 95 | Performed by: STUDENT IN AN ORGANIZED HEALTH CARE EDUCATION/TRAINING PROGRAM

## 2024-06-26 NOTE — NURSING NOTE
Is the patient currently in the state of [place]? YES    Visit mode:VIDEO    If the visit is dropped, the patient can be reconnected by: VIDEO VISIT: Text to cell phone:   Telephone Information:   Mobile [phone]       Will anyone else be joining the visit? NO  (If patient encounters technical issues they should call [phone])    How would you like to obtain your AVS? MyChart    Are changes needed to the allergy or medication list? No    Are refills needed on medications prescribed by this physician? NO    Reason for visit: RECHECK    Annie ANNE

## 2024-06-26 NOTE — LETTER
6/26/2024      Rosio Cunha  [address]  [place] [address]      Dear Colleague,    Thank you for referring your patient, Rosio Cunha, to the [facility] CANCER CLINIC. Please see a copy of my visit note below.    Virtual Visit Details    Type of service:  Video Visit   Video Start Time: 5:30 PM  Video End Time:5:52 PM    Originating Location (pt. Location): Home    Distant Location (provider location):  On-site  Platform used for Video Visit: [facility] CANCER CLINIC    FOLLOW UP VISIT NOTE    PATIENT NAME: Rosio Cunha MRN # [id number]  DATE OF VISIT: June 26, 2024 YOB: 1945    REFERRING PROVIDER: Referred Self, MD  No address on file    CANCER TYPE:   High grade leiomyosarcoma       CHIEF COMPLAIN   Healing arm surgical scan     HISTORY OF PRESENT ILLNESS   80 yo with no significant PMH. She reports that about 2 years she felt a small bump at the upper arm, she brought it up to her PCP and she was told that it was a cyst, it was originally removed on 2019. It continued to grow and she underwent resection again in 4/292024. The pathology was read as atypical spindle cell neoplasm extending to the borders and per report it was sent to [facility] for further characterization. I do not have the result from [place].     She comes here for further management. She has recovered well from surgery     Interval Hx  She feels well, no new symptoms     PAST ONCOLOGIC HISTORY   None     PAST MEDICAL HISTORY   None    PAST SURGICAL HISTORY   None     FAMILY HISTORY   None     ALLERGIES    No Known Allergies       SOCIAL HISTORY     Social History     Social History Narrative     Not on file   Lives by herslef, has 3 children and all live nearby, walks every day,.  Comes with grandkae Koehler      CURRENT OUTPATIENT MEDICATIONS     No current outpatient medications on file.        REVIEW OF SYSTEMS   As above in the HPI, o/w complete 12-point ROS was  negative.     PHYSICAL EXAM   There were no vitals taken for this visit.    Virtual visit only    Prior medical exam    EGO  GEN: NAD  HEENT: PERRL, EOMI, no icterus, injection or pallor. Oropharynx is clear.  NECK: no cervical or supraclavicular lymphadenopathy  LUNGS: clear bilaterally  CV: regular, no murmurs, rubs, or gallops  ABDOMEN: soft, non-tender, non-distended, normal bowel sounds, no hepatosplenomegaly by percussion or palpation  EXT: warm, well perfused, no edema. R arm with an 8 cm surgical scar, well-healed.   NEURO: alert  SKIN: no rashes     LABORATORY AND IMAGING STUDIES     Lab Results   Component Value Date    WBC 6.5 2024     Lab Results   Component Value Date    RBC 4.10 2024     Lab Results   Component Value Date    HGB 12.5 2024     Lab Results   Component Value Date    HCT 37.6 2024     Lab Results   Component Value Date    MCV 92 2024     Lab Results   Component Value Date    MCH 30.5 2024     Lab Results   Component Value Date    MCHC 33.2 2024     Lab Results   Component Value Date    RDW 13.5 2024     Lab Results   Component Value Date     2024     Last Comprehensive Metabolic Panel:  Sodium   Date Value Ref Range Status   2024 140 135 - 145 mmol/L Final     Comment:     Reference intervals for this test were updated on 2023 to more accurately reflect our healthy population. There may be differences in the flagging of prior results with similar values performed with this method. Interpretation of those prior results can be made in the context of the updated reference intervals.      Potassium   Date Value Ref Range Status   2024 4.1 3.4 - 5.3 mmol/L Final     Chloride   Date Value Ref Range Status   2024 104 98 - 107 mmol/L Final     Carbon Dioxide (CO2)   Date Value Ref Range Status   2024 26 22 - 29 mmol/L Final     Anion Gap   Date Value Ref Range Status   2024 10 7 - 15 mmol/L Final      Glucose   Date Value Ref Range Status   06/11/2024 106 (H) 70 - 99 mg/dL Final     GLUCOSE BY METER POCT   Date Value Ref Range Status   05/23/2024 96 70 - 99 mg/dL Final     Urea Nitrogen   Date Value Ref Range Status   06/11/2024 15.6 8.0 - 23.0 mg/dL Final     Creatinine   Date Value Ref Range Status   06/11/2024 0.81 0.51 - 0.95 mg/dL Final     GFR Estimate   Date Value Ref Range Status   06/11/2024 73 >60 mL/min/1.73m2 Final     GFR, ESTIMATED POCT   Date Value Ref Range Status   05/23/2024 >60 >60 mL/min/1.73m2 Final     Calcium   Date Value Ref Range Status   06/11/2024 10.1 8.8 - 10.2 mg/dL Final     Bilirubin Total   Date Value Ref Range Status   06/11/2024 0.7 <=1.2 mg/dL Final     Alkaline Phosphatase   Date Value Ref Range Status   06/11/2024 78 40 - 150 U/L Final     ALT   Date Value Ref Range Status   06/11/2024 17 0 - 50 U/L Final     Comment:     Reference intervals for this test were updated on 6/12/2023 to more accurately reflect our healthy population. There may be differences in the flagging of prior results with similar values performed with this method. Interpretation of those prior results can be made in the context of the updated reference intervals.       AST   Date Value Ref Range Status   06/11/2024 25 0 - 45 U/L Final     Comment:     Reference intervals for this test were updated on 6/12/2023 to more accurately reflect our healthy population. There may be differences in the flagging of prior results with similar values performed with this method. Interpretation of those prior results can be made in the context of the updated reference intervals.       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   COMPARISON: None available at time of dictation  CONTRAST: 68 mm Isovue-370  TECHNIQUE: Serum glucose level 96 mg/dL. One hour post intravenous administration of 10.2 mCi F-18 FDG, PET imaging was performed from the skull vertex to feet, utilizing attenuation correction with concurrent axial CT and PET/CT image fusion. Separate   diagnostic CT of the chest, abdomen, and pelvis was performed. Dose reduction techniques were used.    PET/CT FINDINGS: Mildly FDG avid soft tissue thickening in the subcutaneous tissues of the right proximal upper extremity (Max SUV 3.1). The remaining FDG uptake is physiologic from the skull vertex to feet.    CT FINDINGS: Mild-to-moderate senescent intracranial changes. Postoperative changes of bilateral lenses. Mild paranasal sinus mucosal thickening. The paranasal tract and neck soft tissues are unremarkable. Four-vessel aortic arch. Moderate coronary   artery calcium. Visualized lungs are clear. Small fat-containing umbilical hernia. Sigmoid diverticulosis. Fibroid uterus. Pelvic phleboliths. Multilevel degenerative changes of the spine. The lower extremities are unremarkable      Impression    IMPRESSION:    Mildly FDG avid soft tissue thickening in the subcutaneous tissues of the right proximal upper extremity. 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 Gerald Rowe at [facility].  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  While cutaneous atypical smooth muscle neoplasm has been documented to have generally indolent clinical behavior, I share concerns that the dedifferentiated and anaplastic areas of this tumor make aggressive growth significantly more likely.  As this lesion extends to the deep specimen margin, a reexcision is recommended at this site (alongside additional workup/staging).     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    I reviewed the medical records including prior medical notes, laboratory studies which are notable for adequate renal and hepatic function, and normal blood counts. I have personally reviewed imaging including the most recent PET scan which demonstrates slight increase in FDG uptake at the site of the surgery, no clear evidence of gross residual tumor and no other concerning lesions.         ASSESSMENT AND PLAN     80 yo with no significant PMH. She reports that about 2 years she felt a small bump at the upper arm, she brought it up to her PCP and she was told that it was a cyst, it was originally removed on 2019. It continued to grow and she underwent resection again in 4/292024. The pathology was read as atypical spindle cell neoplasm extending to the borders and per report it was sent to [facility] for further characterization. I do not have the result from [place]. The full specimen including the skin was up to 5.5 cm, but no record of the size of the tumor in the pathology reports.    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     I discussed with the patient that sarcomas  are rare tumors only representing 1% of all cancers and that they are very heterogeneous with over 100 different subtypes. When sarcomas are localized disease they main approach to therapy is surgical resection. However, there are factors that influence the chances of recurrence and development of distant metastasis. In general tumors that are big in size (over 5 cm) and high grade (rapid cell division observed under the microscope) have higher probability of recurrence.     In her case, the tumor seems to be small, and currently there is no measurable disease. I recommend to proceed with re-resection, followed by close surveillance. She is meeting with Dr. Gillis tomorrow.     I explained that she will need local imaging and CT chest every 3-4 months during the first few years after resection.     She had a lot of trouble understanding my accent during the virtual visit and I had to type on the chat most of our conversation. I will request transfer care to my colleague Dr. Anna for surveillance.       Plan:  - MRI R arm and CT chest at the end of september  - follow up Dr Anna after scans      40 minutes spent on the date of the encounter doing chart review, review of outside records, review of test results, interpretation of tests, patient visit, documentation, and discussion with other provider(s)     Bala Rg MD   of Medicine  Hematology, Oncology and Transplantation        Again, thank you for allowing me to participate in the care of your patient.        Sincerely,        Bala Rg MD

## 2024-06-27 ENCOUNTER — PATIENT OUTREACH (OUTPATIENT)
Dept: ONCOLOGY | Facility: CLINIC | Age: 79
End: 2024-06-27
Payer: COMMERCIAL

## 2024-06-27 ENCOUNTER — VIRTUAL VISIT (OUTPATIENT)
Dept: ONCOLOGY | Facility: CLINIC | Age: 79
End: 2024-06-27
Attending: SURGERY
Payer: COMMERCIAL

## 2024-06-27 VITALS — BODY MASS INDEX: 23.55 KG/M2 | WEIGHT: 128 LBS | HEIGHT: 62 IN

## 2024-06-27 DIAGNOSIS — C49.9 LEIOMYOSARCOMA (H): Primary | ICD-10-CM

## 2024-06-27 PROCEDURE — 99207 PR NO BILLABLE SERVICE THIS VISIT: CPT | Mod: 93 | Performed by: SURGERY

## 2024-06-27 ASSESSMENT — PAIN SCALES - GENERAL: PAINLEVEL: NO PAIN (0)

## 2024-06-27 NOTE — PROGRESS NOTES
[facility]: Surgical Oncology Plan of Care Education Note                                     Discussion with Patient:                                                         Spoke with Rosio following her visit with Dr. Gillis on 6/27/2024. Introduced self and explained role of RNCC.       Plan:                                                       Reviewed plan for wide excision of right shoulder sarcoma at the [facility].   Discussed need for H&P within 30 days of surgery.  Rosio prefers to be scheduled for a PAC video visit.     Informed patient they should get a call within the next three business days from our OR  with surgery date, H&P plan and date of post-op visit with their surgeon. Writer answered all questions and concerns to the best of her ability and provided her contact information. Reviewed use of [employer] as alternative way to contact team.  Encouraged patient to contact with questions.         Sonya Burciaga, RNCC  [facility]  Surgical Onolcogy      Approximately 5 minutes was spent in discussion with patient.

## 2024-06-27 NOTE — LETTER
6/27/2024      Rosio Cunha  [address] [facility] [address]      Dear Colleague,    Thank you for referring your patient, Rosio Cunha, to the [facility]. Please see a copy of my visit note below.    Virtual Visit Details      Today I had a follow-up phone visit with Rosio to discuss management of her atypical spindle cell neoplasm of the skin.  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  I informed Rosio of this interpretation and I have recommended a reexcision with negative surgical margins.  She agrees and understands and will perform a wide excision of the of the skin lesion in the [facility] surgery center.    The phone visit was 3 minutes in length and the total time the visit was 5 minutes.    Sincerely,        Moi Gillis MD

## 2024-06-27 NOTE — PROGRESS NOTES
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  I informed Rosio of this interpretation and I have recommended a reexcision with negative surgical margins.  She agrees and understands and will perform a wide excision of the of the skin lesion in the [facility] surgery center.    The phone visit was 3 minutes in length and the total time the visit was 5 minutes.

## 2024-06-27 NOTE — NURSING NOTE
.Is the patient currently in the state of [place]? YES    Visit mode:TELEPHONE    If the visit is dropped, the patient can be reconnected by: TELEPHONE VISIT: Phone number:   Telephone Information:   Mobile [phone]       Will anyone else be joining the visit? NO  (If patient encounters technical issues they should call [phone]    How would you like to obtain your AVS? MyChart    Are changes needed to the allergy or medication list? No    Are refills needed on medications prescribed by this physician? NO    Reason for visit: JEZ ANNE

## 2024-06-28 ENCOUNTER — TELEPHONE (OUTPATIENT)
Dept: ONCOLOGY | Facility: CLINIC | Age: 79
End: 2024-06-28
Payer: COMMERCIAL

## 2024-06-28 ENCOUNTER — PATIENT OUTREACH (OUTPATIENT)
Dept: CARE COORDINATION | Facility: CLINIC | Age: 79
End: 2024-06-28
Payer: COMMERCIAL

## 2024-06-28 NOTE — PROGRESS NOTES
Social Work - Distress Screen Intervention  [facility]    Identified Concern and Score from Distress Screenin. How concerned are you about your ability to eat? 0     2. How concerned are you about unintended weight loss or your current weight? 0     3. How concerned are you about feeling depressed or very sad?  6     4. How concerned are you about feeling anxious or very scared?  (!) 8     5. Do you struggle with the loss of meaning and rosio in your life?  Not at all     6. How concerned are you about work and home life issues that may be affected by your cancer?  0     7. How concerned are you about knowing what resources are available to help you?  5     8. Do you currently have what you would describe as Confucianism or spiritual struggles? Not at all     9. If you want to be contacted by one of our professionals, I can send a message to them right now.  No data recorded     Date of Distress Screen: 24  Data: At time of last visit, patient scored positive on distress screening.  outreached to patient today to follow up on elevated distress and introduce psychosocial services and support.  Intervention/Education provided:  contacted patient by phone to discuss distress screening results.  SW spoke with Rosio and no needs.  Follow-up Required:  will remain available to patient for support as needed  SILVIA Kilgore, [facility]   Adult Oncology - [place]  [phone]  [facility] on    *Please note does not work on .   Support Groups at [facility]: Social Work Services for Cancer Patients ([email or web address])

## 2024-06-28 NOTE — TELEPHONE ENCOUNTER
Called patient to discuss surgery scheduling with Dr. Gillis. Spoke with patient and scheduled surgery with Dr. Gillis at the [facility] for 7/15/24.    H&P scheduled with PAC for 7/9/24 at 9:15 AM. Patient is aware they will get their arrival time for surgery at PAC appointment.    Post-op scheduled for 8/2/24 with Dr. Gillis in [place] for 11:20 AM.    Patient asked for surgery packet to be sent on [employer] as she has issues with her mail. Will send on [employer].    The patient has no further questions regarding surgery at this time. Patient has writers call back number [phone].    Kari Santana on 6/28/2024 at 3:10 PM

## 2024-07-01 NOTE — TELEPHONE ENCOUNTER
FUTURE VISIT INFORMATION      SURGERY INFORMATION:  Date: 7/15/24  Location:  or  Surgeon:  Moi Gillis MD   Anesthesia Type:  General  Procedure: WIDE EXCISION OF SHOULDER SARCOMA   Consult: virtual visit 6/27/24    RECORDS REQUESTED FROM:       Primary Care Provider: Isaak John MD

## 2024-07-09 ENCOUNTER — PRE VISIT (OUTPATIENT)
Dept: SURGERY | Facility: CLINIC | Age: 79
End: 2024-07-09

## 2024-07-10 ENCOUNTER — ANESTHESIA EVENT (OUTPATIENT)
Dept: SURGERY | Facility: AMBULATORY SURGERY CENTER | Age: 79
End: 2024-07-10
Payer: COMMERCIAL

## 2024-07-10 ENCOUNTER — VIRTUAL VISIT (OUTPATIENT)
Dept: SURGERY | Facility: CLINIC | Age: 79
End: 2024-07-10
Payer: COMMERCIAL

## 2024-07-10 ENCOUNTER — PRE VISIT (OUTPATIENT)
Dept: SURGERY | Facility: CLINIC | Age: 79
End: 2024-07-10

## 2024-07-10 VITALS — HEIGHT: 62 IN | WEIGHT: 128 LBS | BODY MASS INDEX: 23.55 KG/M2

## 2024-07-10 DIAGNOSIS — C49.9 LEIOMYOSARCOMA (H): ICD-10-CM

## 2024-07-10 DIAGNOSIS — Z01.818 PREOP EXAMINATION: Primary | ICD-10-CM

## 2024-07-10 PROCEDURE — 99203 OFFICE O/P NEW LOW 30 MIN: CPT | Mod: 95 | Performed by: PHYSICIAN ASSISTANT

## 2024-07-10 RX ORDER — ASPIRIN 81 MG/1
81 TABLET ORAL EVERY MORNING
COMMUNITY

## 2024-07-10 RX ORDER — LISINOPRIL 20 MG/1
1 TABLET ORAL EVERY MORNING
COMMUNITY
Start: 2024-06-27

## 2024-07-10 ASSESSMENT — LIFESTYLE VARIABLES: TOBACCO_USE: 0

## 2024-07-10 ASSESSMENT — ENCOUNTER SYMPTOMS: SEIZURES: 0

## 2024-07-10 NOTE — PATIENT INSTRUCTIONS
Preparing for Your Surgery      Name:  Rosio Cunha   MRN:  [id number]   :  1945   Today's Date:  7/10/2024         Arriving for surgery:  Surgery date:  7/15/24  Arrival time:  7.20AM    Restrictions due to COVID 19:    Please maintain social distance.  Masking is optional.      parking is available for anyone with mobility limitations or disabilities. (Monday- Friday 7 am- 5 pm)    Please come to:    [facility]  [address] [place] [phone]    Please check in on the 5th floor at the Ambulatory Surgery Center.      What can I eat or drink?    -  You may eat and drink normally until 8 hours prior to arrival  time. (Until 11.20PM)  -  You may have clear liquids until 2 hours prior to arrival  time. (Until 5.20AM)    Examples of clear liquids:  Water  Clear broth  Juices (apple, white grape, white cranberry  and cider) without pulp  Noncarbonated, powder based beverages  (lemonade and Thompson-Aid)  Sodas (Sprite, 7-Up, ginger ale and seltzer)  Coffee or tea (without milk or cream)  Gatorade      Which medicines can I take?    Hold Aspirin for 7 days before surgery.   Hold Multivitamins for 7 days before surgery.  Hold Supplements for 7 days before surgery.  Hold Ibuprofen (Advil, Motrin) for 1 day before surgery--unless otherwise directed by surgeon.  Hold Naproxen (Aleve) for 4 days before surgery.    No alcohol or cannabis products for 24 hours prior to procedure.      -  DO NOT take the following medications the day of surgery:  Continue to hold Aspirin.   Lisinopril (Zestril)    -  PLEASE TAKE the following medications the day of surgery:   None.    How do I prepare myself?  - Please take 2 showers (one the night prior to surgery and one the morning of surgery) using Scrubcare or Hibiclens soap.    Use this soap only from the neck to your toes.     Leave the soap on your skin for one minute--then rinse thoroughly.      You may use your own shampoo and  conditioner. No other hair products.   - Please remove all jewelry and body piercings.  - No lotions, deodorants or fragrance.  - No makeup or fingernail polish.   - Bring your ID and insurance card.    -If you have a Deep Brain Stimulator, a Spinal Cord Stimulator, or any implanted Neuro Device, you must bring the remote to the Surgery Center.         ALL PATIENTS ARE REQUIRED TO HAVE A RESPONSIBLE ADULT TO DRIVE AND BE IN ATTENDANCE WITH THEM FOR 24 HOURS FOLLOWING SURGERY.     Covid testing policy as of 12/06/2022  Your surgeon will notify and schedule you for a COVID test if one is needed before surgery--please direct any questions or COVID symptoms to your surgeon      Questions or Concerns:    -For questions regarding the day of surgery, please contact the Ambulatory Surgery Center at [phone].    -If you have health changes between today and your surgery, please contact your surgeon.     - For questions after surgery, please contact your surgeon's office.

## 2024-07-10 NOTE — PROGRESS NOTES
Rosio is a 79 year old who is being evaluated via a billable video visit.    Simba Tay   Rosio is a 79 year old, presenting for the following health issues:  Pre-Op Exam (/)      LUKASZ Flores LPN

## 2024-07-10 NOTE — H&P
Pre-Operative H & P     CC:  Preoperative exam to assess for increased cardiopulmonary risk while undergoing surgery and anesthesia.    Date of Encounter: 7/10/2024  Primary Care Physician:  No Ref-Primary, Physician     Reason for visit:   Encounter Diagnoses   Name Primary?    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 Cunha felt a small bump at the upper arm, she brought it up to her PCP and she was told that it was a cyst, it was originally removed on 2019. It continued to grow and she underwent resection again in 4/292024. 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 She now presents for the above procedure.    PMH is also significant for HTN.    History is obtained from the patient and chart review    Hx of abnormal bleeding or anti-platelet use: on ASA 81 mg    Menstrual history: No LMP recorded. Patient is postmenopausal.:       Past Medical History  Past Medical History:   Diagnosis Date    Leiomyosarcoma (H) 06/2024       Past Surgical History  History reviewed. No pertinent surgical history.    Prior to Admission Medications  Current Outpatient Medications   Medication Sig Dispense Refill    aspirin 81 MG EC tablet Take 81 mg by mouth every morning      lisinopril (ZESTRIL) 20 MG tablet Take 1 tablet by mouth every morning         Allergies  No Known Allergies    Social History  Social History     Socioeconomic History    Marital status:      Spouse name: Not on file     Number of children: Not on file    Years of education: Not on file    Highest education level: Not on file   Occupational History    Not on file   Tobacco Use    Smoking status: Never     Passive exposure: Never    Smokeless tobacco: Never   Substance and Sexual Activity    Alcohol use: Yes     Comment: holidays only    Drug use: Never    Sexual activity: Not on file   Other Topics Concern    Not on file   Social History Narrative    Not on file     Social Determinants of Health     Financial Resource Strain: Not on file   Food Insecurity: Not on file   Transportation Needs: Not on file   Physical Activity: Not on file   Stress: Not on file   Social Connections: Not on file   Interpersonal Safety: Not on file   Housing Stability: Not on file       Family History  Family History   Problem Relation Age of Onset    Anesthesia Reaction No family hx of     Deep Vein Thrombosis (DVT) No family hx of        Review of Systems  The complete review of systems is negative other than noted in the HPI or here.     Anesthesia Evaluation   Pt has not had prior anesthetic         ROS/MED HX  ENT/Pulmonary:  - neg pulmonary ROS  (-) tobacco use, asthma, sleep apnea and recent URI   Neurologic:  - neg neurologic ROS  (-) no seizures and no CVA   Cardiovascular:     (+)  hypertension- -   -  - -                                      METS/Exercise Tolerance: >4 METS Comment: Walks 2-3 miles per day     Hematologic:  - neg hematologic  ROS  (-) history of blood clots and history of blood transfusion   Musculoskeletal:  - neg musculoskeletal ROS     GI/Hepatic:  - neg GI/hepatic ROS  (-) GERD and liver disease   Renal/Genitourinary:  - neg Renal ROS  (-) renal disease   Endo:  - neg endo ROS  (-) Type II DM   Psychiatric/Substance Use:  - neg psychiatric ROS     Infectious Disease:  - neg infectious disease ROS     Malignancy:   (+) Malignancy, History of Other.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   "  Other:  - neg other ROS          Virtual visit -  No vitals were obtained    Physical Exam  Constitutional: Awake, alert, cooperative, no apparent distress, and appears stated age.  HENT: Normocephalic  Respiratory: non labored breathing   Neurologic: Awake, alert, oriented to name, place and time.   Neuropsychiatric: Calm, cooperative. Normal affect.      Prior Labs/Diagnostic Studies   All labs and imaging personally reviewed       The patient's records and results personally reviewed by this provider.       Assessment    Rosio Cunha is a 79 year old female seen as a PAC referral for risk assessment and optimization for anesthesia.    Plan/Recommendations  Pt will be optimized for the proposed procedure.  See below for details on the assessment, risk, and preoperative recommendations    NEUROLOGY  - No history of TIA, CVA or seizure    -Post Op delirium risk factors:  Age    ENT  - No current airway concerns.  Will need to be reassessed day of surgery.  Mallampati: Unable to assess  TM: Unable to assess    CARDIAC  - HTN, hold lisinopril DOS  - METS (Metabolic Equivalents)   Walks 2-3 miles per day  Patient performs 4 or more METS exercise without symptoms             Total Score: 0      RCRI-Very low risk: Class 1 0.4% complication rate             Total Score: 0        PULMONARY  DAVID Low Risk             Total Score: 2    DAVID: Hypertension    DAVID: Over 50 ys old      - Denies asthma or inhaler use  - Tobacco History    History   Smoking Status    Never   Smokeless Tobacco    Never       GI  - Denies GERD  PONV Medium Risk  Total Score: 2           1 AN PONV: Pt is Female    1 AN PONV: Patient is not a current smoker          ENDOCRINE    - BMI: Estimated body mass index is 23.41 kg/m  as calculated from the following:    Height as of this encounter: 1.575 m (5' 2\").    Weight as of this encounter: 58.1 kg (128 lb).  Healthy Weight (BMI 18.5-24.9)  - No history of Diabetes Mellitus    HEME/ ONC  VTE " Medium Risk 1.8%             Total Score: 6    VTE: Greater than 59 yrs old    VTE: Current cancer      - On ASA 81 mg, will hold x7d prior  - Leiomyosarcoma of right shoulder/arm      The patient is aware that the final anesthesia plan will be decided by the assigned anesthesia provider on the date of service.      The patient is optimized for their procedure. AVS with information on surgery time/arrival time, meds and NPO status given by nursing staff. No further diagnostic testing indicated.    Please refer to the physical examination documented by the anesthesiologist in the anesthesia record on the day of surgery.    Video-Visit Details    Type of service:  Video Visit    Provider received verbal consent for a Video Visit from the patient? Yes   Video Start Time:  1027  Video End Time: 1039    Originating Location (pt. Location): Home    Distant Location (provider location):  Off-site  Mode of Communication:  Video Conference via [employer] (Visit began with [employer]. Video/audio very choppy, then froze. Last 2 minutes completed via [employer].)    On the day of service:     Prep time: 12 minutes  Visit time: 12 minutes  Documentation time: 9 minutes  ------------------------------------------  Total time: 33 minutes      Shawna Zamora PA-C  Preoperative Assessment Center  [facility]  Clinic and Surgery Center  Phone: [phone]  Fax: [phone]

## 2024-07-14 ASSESSMENT — ENCOUNTER SYMPTOMS: SEIZURES: 0

## 2024-07-14 ASSESSMENT — LIFESTYLE VARIABLES: TOBACCO_USE: 0

## 2024-07-14 NOTE — ANESTHESIA PREPROCEDURE EVALUATION
Anesthesia Pre-Procedure Evaluation    Patient: Rosio Cunha   MRN: [id number] : 1945        Procedure : Procedure(s):  WIDE EXCISION OF SHOULDER SARCOMA          Past Medical History:   Diagnosis Date    Leiomyosarcoma (H) 2024      No past surgical history on file.   No Known Allergies   Social History     Tobacco Use    Smoking status: Never     Passive exposure: Never    Smokeless tobacco: Never   Substance Use Topics    Alcohol use: Yes     Comment: holidays only      Wt Readings from Last 1 Encounters:   07/10/24 58.1 kg (128 lb)        Anesthesia Evaluation   Pt has not had prior anesthetic         ROS/MED HX  ENT/Pulmonary:  - neg pulmonary ROS  (-) tobacco use, asthma, sleep apnea and recent URI   Neurologic:  - neg neurologic ROS  (-) no seizures and no CVA   Cardiovascular:     (+)  hypertension- -   -  - -                                      METS/Exercise Tolerance: >4 METS Comment: Walks 2-3 miles per day     Hematologic:  - neg hematologic  ROS  (-) history of blood clots and history of blood transfusion   Musculoskeletal:  - neg musculoskeletal ROS     GI/Hepatic:  - neg GI/hepatic ROS  (-) GERD and liver disease   Renal/Genitourinary:  - neg Renal ROS  (-) renal disease   Endo:  - neg endo ROS  (-) Type II DM   Psychiatric/Substance Use:  - neg psychiatric ROS     Infectious Disease:  - neg infectious disease ROS     Malignancy:   (+) Malignancy, History of Other.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    Other:  - neg other ROS          Physical Exam    Airway        Mallampati: II   TM distance: > 3 FB   Neck ROM: full   Mouth opening: > 3 cm    Respiratory Devices and Support         Dental       (+) Minor Abnormalities - some fillings, tiny chips      Cardiovascular   cardiovascular exam normal          Pulmonary   pulmonary exam normal                OUTSIDE LABS:  CBC:   Lab Results   Component Value Date    WBC 6.5 2024    HGB 12.5 2024  "   HCT 37.6 06/11/2024     06/11/2024     BMP:   Lab Results   Component Value Date     06/11/2024    POTASSIUM 4.1 06/11/2024    CHLORIDE 104 06/11/2024    CO2 26 06/11/2024    BUN 15.6 06/11/2024    CR 0.81 06/11/2024    CR 0.8 05/23/2024     (H) 06/11/2024    GLC 96 05/23/2024     COAGS: No results found for: \"PTT\", \"INR\", \"FIBR\"  POC: No results found for: \"BGM\", \"HCG\", \"HCGS\"  HEPATIC:   Lab Results   Component Value Date    ALBUMIN 4.9 06/11/2024    PROTTOTAL 7.2 06/11/2024    ALT 17 06/11/2024    AST 25 06/11/2024    ALKPHOS 78 06/11/2024    BILITOTAL 0.7 06/11/2024     OTHER:   Lab Results   Component Value Date    NIXON 10.1 06/11/2024       Anesthesia Plan    ASA Status:  2    NPO Status:  NPO Appropriate    Anesthesia Type: General.     - Airway: LMA   Induction: Intravenous, Propofol.   Maintenance: Balanced.        Consents    Anesthesia Plan(s) and associated risks, benefits, and realistic alternatives discussed. Questions answered and patient/representative(s) expressed understanding.     - Discussed: Risks, Benefits and Alternatives for BOTH SEDATION and the PROCEDURE were discussed     - Discussed with:  Patient, Other (See Comment) (grandson)      - Extended Intubation/Ventilatory Support Discussed: No.      - Patient is DNR/DNI Status: No     Use of blood products discussed: No .     Postoperative Care    Pain management: IV analgesics, Oral pain medications, Multi-modal analgesia.   PONV prophylaxis: Dexamethasone or Solumedrol, Ondansetron (or other 5HT-3), Background Propofol Infusion     Comments:               Guillermo Cobb MD    I have reviewed the pertinent notes and labs in the chart from the past 30 days and (re)examined the patient.  Any updates or changes from those notes are reflected in this note.                  "

## 2024-07-15 ENCOUNTER — ANESTHESIA (OUTPATIENT)
Dept: SURGERY | Facility: AMBULATORY SURGERY CENTER | Age: 79
End: 2024-07-15
Payer: COMMERCIAL

## 2024-07-15 ENCOUNTER — HOSPITAL ENCOUNTER (OUTPATIENT)
Facility: AMBULATORY SURGERY CENTER | Age: 79
Discharge: HOME | End: 2024-07-15
Attending: SURGERY
Payer: COMMERCIAL

## 2024-07-15 VITALS
WEIGHT: 128 LBS | BODY MASS INDEX: 23.55 KG/M2 | DIASTOLIC BLOOD PRESSURE: 74 MMHG | SYSTOLIC BLOOD PRESSURE: 121 MMHG | HEIGHT: 62 IN | HEART RATE: 62 BPM | RESPIRATION RATE: 16 BRPM | OXYGEN SATURATION: 95 % | TEMPERATURE: 97.5 F

## 2024-07-15 DIAGNOSIS — C49.9 LEIOMYOSARCOMA (H): Primary | ICD-10-CM

## 2024-07-15 PROCEDURE — 88341 IMHCHEM/IMCYTCHM EA ADD ANTB: CPT | Mod: 26 | Performed by: PATHOLOGY

## 2024-07-15 PROCEDURE — 99100 ANES PT EXTEME AGE<1 YR&>70: CPT | Performed by: NURSE ANESTHETIST, CERTIFIED REGISTERED

## 2024-07-15 PROCEDURE — 23073 EXC SHOULDER TUM DEEP 5 CM/>: CPT | Performed by: NURSE ANESTHETIST, CERTIFIED REGISTERED

## 2024-07-15 PROCEDURE — 88342 IMHCHEM/IMCYTCHM 1ST ANTB: CPT | Mod: TC | Performed by: SURGERY

## 2024-07-15 PROCEDURE — 23073 EXC SHOULDER TUM DEEP 5 CM/>: CPT | Mod: RT

## 2024-07-15 PROCEDURE — 88307 TISSUE EXAM BY PATHOLOGIST: CPT | Mod: 26 | Performed by: PATHOLOGY

## 2024-07-15 PROCEDURE — 23073 EXC SHOULDER TUM DEEP 5 CM/>: CPT | Mod: RT | Performed by: SURGERY

## 2024-07-15 PROCEDURE — 23073 EXC SHOULDER TUM DEEP 5 CM/>: CPT | Performed by: STUDENT IN AN ORGANIZED HEALTH CARE EDUCATION/TRAINING PROGRAM

## 2024-07-15 PROCEDURE — 99100 ANES PT EXTEME AGE<1 YR&>70: CPT | Performed by: STUDENT IN AN ORGANIZED HEALTH CARE EDUCATION/TRAINING PROGRAM

## 2024-07-15 PROCEDURE — 88342 IMHCHEM/IMCYTCHM 1ST ANTB: CPT | Mod: 26 | Performed by: PATHOLOGY

## 2024-07-15 RX ORDER — NALOXONE HYDROCHLORIDE 0.4 MG/ML
0.1 INJECTION, SOLUTION INTRAMUSCULAR; INTRAVENOUS; SUBCUTANEOUS
Status: DISCONTINUED | OUTPATIENT
Start: 2024-07-15 | End: 2024-07-16 | Stop reason: HOSPADM

## 2024-07-15 RX ORDER — OXYCODONE HYDROCHLORIDE 5 MG/1
10 TABLET ORAL
Status: DISCONTINUED | OUTPATIENT
Start: 2024-07-15 | End: 2024-07-16 | Stop reason: HOSPADM

## 2024-07-15 RX ORDER — SODIUM CHLORIDE, SODIUM LACTATE, POTASSIUM CHLORIDE, CALCIUM CHLORIDE 600; 310; 30; 20 MG/100ML; MG/100ML; MG/100ML; MG/100ML
INJECTION, SOLUTION INTRAVENOUS CONTINUOUS
Status: DISCONTINUED | OUTPATIENT
Start: 2024-07-15 | End: 2024-07-16 | Stop reason: HOSPADM

## 2024-07-15 RX ORDER — METHOCARBAMOL 750 MG/1
750 TABLET, FILM COATED ORAL
Status: DISCONTINUED | OUTPATIENT
Start: 2024-07-15 | End: 2024-07-16 | Stop reason: HOSPADM

## 2024-07-15 RX ORDER — ONDANSETRON 4 MG/1
4 TABLET, ORALLY DISINTEGRATING ORAL
Status: DISCONTINUED | OUTPATIENT
Start: 2024-07-15 | End: 2024-07-16 | Stop reason: HOSPADM

## 2024-07-15 RX ORDER — OXYCODONE HYDROCHLORIDE 5 MG/1
5-10 TABLET ORAL EVERY 4 HOURS PRN
Qty: 10 TABLET | Refills: 0 | Status: SHIPPED | OUTPATIENT
Start: 2024-07-15

## 2024-07-15 RX ORDER — ACETAMINOPHEN 325 MG/1
975 TABLET ORAL ONCE
Status: COMPLETED | OUTPATIENT
Start: 2024-07-15 | End: 2024-07-15

## 2024-07-15 RX ORDER — ONDANSETRON 4 MG/1
4 TABLET, ORALLY DISINTEGRATING ORAL EVERY 30 MIN PRN
Status: DISCONTINUED | OUTPATIENT
Start: 2024-07-15 | End: 2024-07-16 | Stop reason: HOSPADM

## 2024-07-15 RX ORDER — PROPOFOL 10 MG/ML
INJECTION, EMULSION INTRAVENOUS CONTINUOUS PRN
Status: DISCONTINUED | OUTPATIENT
Start: 2024-07-15 | End: 2024-07-15

## 2024-07-15 RX ORDER — OXYCODONE HYDROCHLORIDE 5 MG/1
5 TABLET ORAL
Status: DISCONTINUED | OUTPATIENT
Start: 2024-07-15 | End: 2024-07-16 | Stop reason: HOSPADM

## 2024-07-15 RX ORDER — CEFAZOLIN SODIUM 2 G/50ML
2 SOLUTION INTRAVENOUS SEE ADMIN INSTRUCTIONS
Status: DISCONTINUED | OUTPATIENT
Start: 2024-07-15 | End: 2024-07-15 | Stop reason: HOSPADM

## 2024-07-15 RX ORDER — ONDANSETRON 2 MG/ML
INJECTION INTRAMUSCULAR; INTRAVENOUS PRN
Status: DISCONTINUED | OUTPATIENT
Start: 2024-07-15 | End: 2024-07-15

## 2024-07-15 RX ORDER — ACETAMINOPHEN 325 MG/1
650 TABLET ORAL
Status: DISCONTINUED | OUTPATIENT
Start: 2024-07-15 | End: 2024-07-16 | Stop reason: HOSPADM

## 2024-07-15 RX ORDER — HYDROMORPHONE HYDROCHLORIDE 1 MG/ML
0.2 INJECTION, SOLUTION INTRAMUSCULAR; INTRAVENOUS; SUBCUTANEOUS EVERY 5 MIN PRN
Status: DISCONTINUED | OUTPATIENT
Start: 2024-07-15 | End: 2024-07-16 | Stop reason: HOSPADM

## 2024-07-15 RX ORDER — LIDOCAINE HYDROCHLORIDE 20 MG/ML
INJECTION, SOLUTION INFILTRATION; PERINEURAL PRN
Status: DISCONTINUED | OUTPATIENT
Start: 2024-07-15 | End: 2024-07-15

## 2024-07-15 RX ORDER — ENOXAPARIN SODIUM 100 MG/ML
40 INJECTION SUBCUTANEOUS
Status: COMPLETED | OUTPATIENT
Start: 2024-07-15 | End: 2024-07-15

## 2024-07-15 RX ORDER — PROPOFOL 10 MG/ML
INJECTION, EMULSION INTRAVENOUS PRN
Status: DISCONTINUED | OUTPATIENT
Start: 2024-07-15 | End: 2024-07-15

## 2024-07-15 RX ORDER — DEXAMETHASONE SODIUM PHOSPHATE 4 MG/ML
INJECTION, SOLUTION INTRA-ARTICULAR; INTRALESIONAL; INTRAMUSCULAR; INTRAVENOUS; SOFT TISSUE PRN
Status: DISCONTINUED | OUTPATIENT
Start: 2024-07-15 | End: 2024-07-15

## 2024-07-15 RX ORDER — FENTANYL CITRATE 50 UG/ML
25 INJECTION, SOLUTION INTRAMUSCULAR; INTRAVENOUS EVERY 5 MIN PRN
Status: DISCONTINUED | OUTPATIENT
Start: 2024-07-15 | End: 2024-07-16 | Stop reason: HOSPADM

## 2024-07-15 RX ORDER — FENTANYL CITRATE 50 UG/ML
50 INJECTION, SOLUTION INTRAMUSCULAR; INTRAVENOUS EVERY 5 MIN PRN
Status: DISCONTINUED | OUTPATIENT
Start: 2024-07-15 | End: 2024-07-16 | Stop reason: HOSPADM

## 2024-07-15 RX ORDER — METHOCARBAMOL 500 MG/1
500 TABLET, FILM COATED ORAL 4 TIMES DAILY PRN
Qty: 30 TABLET | Refills: 0 | Status: SHIPPED | OUTPATIENT
Start: 2024-07-15

## 2024-07-15 RX ORDER — ACETAMINOPHEN 325 MG/1
650 TABLET ORAL EVERY 4 HOURS PRN
Qty: 50 TABLET | Refills: 0 | Status: SHIPPED | OUTPATIENT
Start: 2024-07-15

## 2024-07-15 RX ORDER — KETOROLAC TROMETHAMINE 30 MG/ML
INJECTION, SOLUTION INTRAMUSCULAR; INTRAVENOUS PRN
Status: DISCONTINUED | OUTPATIENT
Start: 2024-07-15 | End: 2024-07-15

## 2024-07-15 RX ORDER — ONDANSETRON 2 MG/ML
4 INJECTION INTRAMUSCULAR; INTRAVENOUS EVERY 30 MIN PRN
Status: DISCONTINUED | OUTPATIENT
Start: 2024-07-15 | End: 2024-07-16 | Stop reason: HOSPADM

## 2024-07-15 RX ORDER — FENTANYL CITRATE 50 UG/ML
INJECTION, SOLUTION INTRAMUSCULAR; INTRAVENOUS PRN
Status: DISCONTINUED | OUTPATIENT
Start: 2024-07-15 | End: 2024-07-15

## 2024-07-15 RX ORDER — POLYETHYLENE GLYCOL 3350 17 G/17G
17 POWDER, FOR SOLUTION ORAL DAILY
COMMUNITY
Start: 2024-07-15

## 2024-07-15 RX ORDER — HYDROMORPHONE HYDROCHLORIDE 1 MG/ML
0.4 INJECTION, SOLUTION INTRAMUSCULAR; INTRAVENOUS; SUBCUTANEOUS EVERY 5 MIN PRN
Status: DISCONTINUED | OUTPATIENT
Start: 2024-07-15 | End: 2024-07-16 | Stop reason: HOSPADM

## 2024-07-15 RX ORDER — BUPIVACAINE HYDROCHLORIDE AND EPINEPHRINE 2.5; 5 MG/ML; UG/ML
INJECTION, SOLUTION EPIDURAL; INFILTRATION; INTRACAUDAL; PERINEURAL DAILY PRN
Status: DISCONTINUED | OUTPATIENT
Start: 2024-07-15 | End: 2024-07-15 | Stop reason: HOSPADM

## 2024-07-15 RX ORDER — DEXAMETHASONE SODIUM PHOSPHATE 10 MG/ML
4 INJECTION, SOLUTION INTRAMUSCULAR; INTRAVENOUS
Status: DISCONTINUED | OUTPATIENT
Start: 2024-07-15 | End: 2024-07-16 | Stop reason: HOSPADM

## 2024-07-15 RX ORDER — CEFAZOLIN SODIUM 2 G/50ML
2 SOLUTION INTRAVENOUS
Status: COMPLETED | OUTPATIENT
Start: 2024-07-15 | End: 2024-07-15

## 2024-07-15 RX ORDER — SODIUM CHLORIDE, SODIUM LACTATE, POTASSIUM CHLORIDE, CALCIUM CHLORIDE 600; 310; 30; 20 MG/100ML; MG/100ML; MG/100ML; MG/100ML
INJECTION, SOLUTION INTRAVENOUS CONTINUOUS PRN
Status: DISCONTINUED | OUTPATIENT
Start: 2024-07-15 | End: 2024-07-15

## 2024-07-15 RX ADMIN — ACETAMINOPHEN 975 MG: 325 TABLET ORAL at 07:47

## 2024-07-15 RX ADMIN — PROPOFOL 50 MG: 10 INJECTION, EMULSION INTRAVENOUS at 09:38

## 2024-07-15 RX ADMIN — ENOXAPARIN SODIUM 40 MG: 100 INJECTION SUBCUTANEOUS at 07:48

## 2024-07-15 RX ADMIN — KETOROLAC TROMETHAMINE 15 MG: 30 INJECTION, SOLUTION INTRAMUSCULAR; INTRAVENOUS at 09:50

## 2024-07-15 RX ADMIN — LIDOCAINE HYDROCHLORIDE 60 MG: 20 INJECTION, SOLUTION INFILTRATION; PERINEURAL at 09:19

## 2024-07-15 RX ADMIN — ONDANSETRON 4 MG: 2 INJECTION INTRAMUSCULAR; INTRAVENOUS at 09:26

## 2024-07-15 RX ADMIN — PROPOFOL 150 MG: 10 INJECTION, EMULSION INTRAVENOUS at 09:19

## 2024-07-15 RX ADMIN — SODIUM CHLORIDE, SODIUM LACTATE, POTASSIUM CHLORIDE, CALCIUM CHLORIDE: 600; 310; 30; 20 INJECTION, SOLUTION INTRAVENOUS at 09:10

## 2024-07-15 RX ADMIN — FENTANYL CITRATE 50 MCG: 50 INJECTION, SOLUTION INTRAMUSCULAR; INTRAVENOUS at 09:10

## 2024-07-15 RX ADMIN — CEFAZOLIN SODIUM 2 G: 2 SOLUTION INTRAVENOUS at 09:10

## 2024-07-15 RX ADMIN — PROPOFOL 200 MCG/KG/MIN: 10 INJECTION, EMULSION INTRAVENOUS at 09:19

## 2024-07-15 RX ADMIN — DEXAMETHASONE SODIUM PHOSPHATE 4 MG: 4 INJECTION, SOLUTION INTRA-ARTICULAR; INTRALESIONAL; INTRAMUSCULAR; INTRAVENOUS; SOFT TISSUE at 09:24

## 2024-07-15 NOTE — DISCHARGE INSTRUCTIONS
Tylenol 975 mg given at 7:45am.   Ok to take more after 1:45pm.    [facility]  Home Care Following Anesthesia  For 24 hours after surgery:  Get plenty of rest.  A responsible adult must stay with you for at least 24 hours after you leave the surgery center.  Do not drive or use heavy equipment.  If you have weakness or tingling, don't drive or use heavy equipment until this feeling goes away.   Do not drink alcohol.   Avoid strenuous or risky activities.  Ask for help when climbing stairs.  You may feel lightheaded.  IF so, sit for a few minutes before standing.  Have someone help you get up.   If you have nausea (feel sick to your stomach): Drink only clear liquids such as apple juice, ginger ale, broth or 7-Up.  Rest may also help.  Be sure to drink enough fluids.  Move to a regular diet as you feel able.   You may have a slight fever.  Call the doctor if your fever is over 100 F (37.7 C) (taken under the tongue) or lasts longer than 24 hours.  You may have a dry mouth, a sore throat, muscle aches or trouble sleeping. These should go away after 24 hours.  Do not make important or legal decisions.   It is recommended to avoid smoking.               Tips for taking pain medications  To get the best pain relief possible, remember these points:  Take pain medications as directed, before pain becomes severe.  Pain medication can upset your stomach: taking it with food may help.  Constipation is a common side effect of pain medication. Drink plenty of  fluids.  Eat foods high in fiber. Take a stool softener if recommended by your doctor or pharmacist.  Do not drink alcohol, drive or operate machinery while taking pain medications.  Ask about other ways to control pain, such as with heat, ice or relaxation.    Tylenol/Acetaminophen Consumption    If you feel your pain relief is insufficient, you may take Tylenol/Acetaminophen in addition to your narcotic pain medication.   Be careful  not to exceed 4,000 mg of Tylenol/Acetaminophen in a 24 hour period from all sources.  If you are taking extra strength Tylenol/acetaminophen (500 mg), the maximum dose is 8 tablets in 24 hours.  If you are taking regular strength acetaminophen (325 mg), the maximum dose is 12 tablets in 24 hours.    Call a doctor for any of the following:  Signs of infection (fever, growing tenderness at the surgery site, a large amount of drainage or bleeding, severe pain, foul-smelling drainage, redness, swelling).  It has been over 8 to 10 hours since surgery and you are still not able to urinate (pass water).  Headache for over 24 hours.  Numbness, tingling or weakness the day after surgery (if you had spinal anesthesia).  Signs of Covid-19 infection (temperature over 100 degrees, shortness of breath, cough, loss of taste/smell, generalized body aches, persistent headache, chills, sore throat, nausea/vomiting/diarrhea)  Your doctor is:       Dr. Moi Gillis, [facility]: [phone]               Or dial [phone] and ask for the resident on call for:  [facility]  For emergency care, call the:  [facility]:  [phone] (TTY for hearing impaired: [phone])

## 2024-07-15 NOTE — ANESTHESIA POSTPROCEDURE EVALUATION
Patient: Rosio Cunha    Procedure: Procedure(s):  WIDE EXCISION OF SHOULDER SARCOMA       Anesthesia Type:  General    Note:  Disposition: Outpatient   Postop Pain Control: Uneventful            Sign Out: Well controlled pain   PONV: No   Neuro/Psych: Uneventful            Sign Out: Acceptable/Baseline neuro status   Airway/Respiratory: Uneventful            Sign Out: Acceptable/Baseline resp. status   CV/Hemodynamics: Uneventful            Sign Out: Acceptable CV status; No obvious hypovolemia; No obvious fluid overload   Other NRE:    DID A NON-ROUTINE EVENT OCCUR?            Last vitals:  Vitals Value Taken Time   /65 07/15/24 1030   Temp 36.4  C (97.5  F) 07/15/24 1030   Pulse 56 07/15/24 1030   Resp 14 07/15/24 1030   SpO2 94 % 07/15/24 1030       Electronically Signed By: Guillermo Cobb MD  July 15, 2024  12:16 PM   details…

## 2024-07-15 NOTE — ANESTHESIA PROCEDURE NOTES
Airway       Patient location during procedure: OR  Staff -        Anesthesiologist:  Guillermo Cobb MD       CRNA: Raven Simmons APRN CRNA       Performed By: CRNA  Consent for Airway        Urgency: elective  Indications and Patient Condition       Indications for airway management: cristo-procedural       Induction type:intravenous       Mask difficulty assessment: 0 - not attempted    Final Airway Details       Final airway type: supraglottic airway    Supraglottic Airway Details        Type: LMA       Brand: I-Gel       LMA size: 4    Post intubation assessment        Placement verified by: capnometry, equal breath sounds and chest rise        Number of attempts at approach: 1       Number of other approaches attempted: 0       Secured with: tape       Ease of procedure: easy       Dentition: Intact and Unchanged

## 2024-07-15 NOTE — ANESTHESIA CARE TRANSFER NOTE
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    Anesthesia Type:   General     Note:    Oropharynx: oropharynx clear of all foreign objects and spontaneously breathing  Level of Consciousness: drowsy  Oxygen Supplementation: room air    Independent Airway: airway patency satisfactory and stable  Dentition: dentition unchanged  Vital Signs Stable: post-procedure vital signs reviewed and stable  Report to RN Given: handoff report given  Patient transferred to: PACU  Comments: Vital signs per nursing documentation.       Handoff Report: Identifed the Patient, Identified the Reponsible Provider, Reviewed the pertinent medical history, Discussed the surgical course, Reviewed Intra-OP anesthesia mangement and issues during anesthesia, Set expectations for post-procedure period and Allowed opportunity for questions and acknowledgement of understanding      Vitals:  Vitals Value Taken Time   BP     Temp     Pulse 65 07/15/24 1013   Resp 13 07/15/24 1013   SpO2 93 % 07/15/24 1013   Vitals shown include unfiled device data.    Electronically Signed By: MICKI Toussaint CRNA  July 15, 2024  10:13 AM

## 2024-07-15 NOTE — OP NOTE
Preoperative Diagnosis: Sarcoma right shoulder    Postoperative Diagnosis: Same    PROCEDURE NOTE: Wide local excision of right shoulder sarcoma, defect size 12 cm x 2 cm    Surgeons: Dr. Moi Gillis and Dr. Fer szymanski    Anesthesia: General    Indications for Surgery: The patient is a 79-year-old woman who underwent an excision of a tumor of her right shoulder.  The pathology demonstrated leiomyosarcoma with a positive deep margin.  She now presents for surgical treatment.    Procedure in Detail: After informed consent the patient was brought to the operating room given a general anesthetic.  She was placed in a semilateral position and then prepped and draped in usual fashion.  I drew out a 1 cm margin resection around her skin and then created an elliptical skin incision.  Before the skin incision I injected local anesthetic.  The skin incision was made with a scalpel.  The Bovie cautery was used to incise subcutaneous tissues.  The dissection proceeded through the subcutaneous tissue down to the underlying fascia.  The fascia was incised.  The underlying deltoid muscle was excised partially with the specimen the specimen was removed oriented and sent to the pathology lab.  Strict hemostasis was obtained with the Bovie cautery.  Incision was closed in layers with interrupted 3-0 Vicryl suture for the dermal layer and a running 4-0 PDS subcuticular stitch for the skin.  Dermabond was placed and the patient was taken to the recovery room in stable condition.          Moi Gillis MD, MS  Surgical Oncology

## 2024-07-19 LAB
PATH REPORT.COMMENTS IMP SPEC: ABNORMAL
PATH REPORT.COMMENTS IMP SPEC: YES
PATH REPORT.FINAL DX SPEC: ABNORMAL
PATH REPORT.GROSS SPEC: ABNORMAL
PATH REPORT.MICROSCOPIC SPEC OTHER STN: ABNORMAL
PATH REPORT.RELEVANT HX SPEC: ABNORMAL
PATHOLOGY SYNOPTIC REPORT: ABNORMAL
PHOTO IMAGE: ABNORMAL

## 2024-07-27 ENCOUNTER — HEALTH MAINTENANCE LETTER (OUTPATIENT)
Age: 79
End: 2024-07-27

## 2024-08-02 ENCOUNTER — ONCOLOGY VISIT (OUTPATIENT)
Dept: ONCOLOGY | Facility: CLINIC | Age: 79
End: 2024-08-02
Attending: SURGERY
Payer: MEDICARE

## 2024-08-02 VITALS
WEIGHT: 130.8 LBS | DIASTOLIC BLOOD PRESSURE: 84 MMHG | TEMPERATURE: 98.2 F | RESPIRATION RATE: 16 BRPM | HEART RATE: 67 BPM | SYSTOLIC BLOOD PRESSURE: 158 MMHG | BODY MASS INDEX: 23.92 KG/M2 | OXYGEN SATURATION: 99 %

## 2024-08-02 DIAGNOSIS — C49.12 MALIGNANT NEOPLASM OF CONNECTIVE AND SOFT TISSUE OF LEFT UPPER LIMB, INCLUDING SHOULDER (H): Primary | ICD-10-CM

## 2024-08-02 ASSESSMENT — PAIN SCALES - GENERAL: PAINLEVEL: NO PAIN (0)

## 2024-08-02 NOTE — LETTER
8/2/2024      Rosio Cunha  [address] [facility] [address]      Dear Colleague,    Thank you for referring your patient, Rosio Cunha, to the [facility]. Please see a copy of my visit note below.    Rosio is here for a postoperative visit.  She underwent resection of a skin sarcoma on July 15.  She has done well since her surgery with no postoperative complications.  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  Her incision is healing well with no evidence of infection.    Impression postop    Plan: I talked about the margin status I told her I would recommend reexcision of her positive margin.  This to be done in the amatory surgery.  After her surgery we will have her see radiation oncology as well.      Again, thank you for allowing me to participate in the care of your patient.        Sincerely,        Moi Gillis MD

## 2024-08-02 NOTE — PATIENT INSTRUCTIONS
Re-excision of right shoulder sarcoma     Post-op visit with Dr. Elis Burciaga, RNCC @ [phone]

## 2024-08-02 NOTE — PROGRESS NOTES
Rosio is here for a postoperative visit.  She underwent resection of a skin sarcoma on July 15.  She has done well since her surgery with no postoperative complications.  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  Her incision is healing well with no evidence of infection.    Impression postop    Plan: I talked about the margin status I told her I would recommend reexcision of her positive margin.  This to be done in the amatory surgery.  After her surgery we will have her see radiation oncology as well.

## 2024-08-02 NOTE — NURSING NOTE
"Oncology Rooming Note    August 2, 2024 11:39 AM   Rosio Cunha is a 79 year old female who presents for:    Chief Complaint   Patient presents with    Oncology Clinic Visit     Leiomyosarcoma      Initial Vitals: BP (!) 158/84 (BP Location: Left arm, Patient Position: Sitting, Cuff Size: Adult Regular)   Pulse 67   Temp 98.2  F (36.8  C) (Oral)   Resp 16   Wt 59.3 kg (130 lb 12.8 oz)   SpO2 99%   BMI 23.92 kg/m   Estimated body mass index is 23.92 kg/m  as calculated from the following:    Height as of 7/15/24: 1.575 m (5' 2\").    Weight as of this encounter: 59.3 kg (130 lb 12.8 oz). Body surface area is 1.61 meters squared.  No Pain (0) Comment: Data Unavailable   No LMP recorded. Patient is postmenopausal.  Allergies reviewed: Yes  Medications reviewed: Yes    Medications: Medication refills not needed today.  Pharmacy name entered into [employer]:    [facility] [address] [place] [address] PHARMACY [place], [place] - [address] [place] [address] [phone]    Frailty Screening:   Is the patient here for a new oncology consult visit in cancer care? 2. No      Clinical concerns: none    Destinee Gonsalez              "

## 2024-08-05 ENCOUNTER — HOSPITAL ENCOUNTER (OUTPATIENT)
Facility: CLINIC | Age: 79
End: 2024-08-05
Attending: SURGERY | Admitting: SURGERY
Payer: MEDICARE

## 2024-08-05 ENCOUNTER — TELEPHONE (OUTPATIENT)
Dept: ONCOLOGY | Facility: CLINIC | Age: 79
End: 2024-08-05
Payer: COMMERCIAL

## 2024-08-05 NOTE — TELEPHONE ENCOUNTER
Called patient to schedule surgery with Dr. Gillis. Patient noted her  is going through family issues at the time and does not know when they would be able to drive her. Patient asked for a couple days that would work for surgery. Offered 8/21 and 8/26. Patient took writers direct call back number and will call back once she knows what date she wants to schedule.    Kari Santana on 8/5/2024 at 8:31 AM

## 2024-08-06 NOTE — TELEPHONE ENCOUNTER
"Patient called back and scheduled surgery with Dr. Gillis.     Called patient to schedule surgery with Dr. Gillis    Spoke with:  Patient    Date of Surgery: 8/21/24    Arrival time Discussed with Patient:  No    [facility] of [facility]: [facility]     Pre-Op H&P: Dr. Gillis will update/complete the morning of surgery.    Post-Op Appts: Not scheduled yet - see notes     Imaging:  Not Applicable - N/A  Scheduled: N/A     Discussed with patient pre-op RN will call 2-3 days prior to surgery with arrival time and instructions:  Yes     Packet sent out: No  via Received in clinic by RN  [DATE] 8/2/24    Surgical Soap: Receiving via Received in clinic by RN       Informed patient that they will need an adult  to bring patient home from surgery: Yes  : Grandson       Additional Comments:  Patient has a lot of questions for Dr. Gillis/clinic.    Patient would like to know how Dr. Gillis was able to draw a picture to her and show her where he needs to excise and how they will know that this surgery will remove everything. Patient understands this cancer is more aggressive, but she does not understand how Dr. Gillis knows exactly where to take from the surgery site. She would like to know how does he know how to pinpoint it.    Patient is also starting to have complications asking her grandson to consistently bring her to appointments. She is wondering if she crosses into [place], if she would be eligible for the medical rides/ride share programs. She was initially told \"we don't go to [place]\" and denied her rides, but patient is willing to come into [place] for rides if she could then get approved for them.     Patient would like to know what the point of her CT/MRI is if she is having surgery prior to those imaging appointments, and would like to know if she would need another surgery afterwards.    Lastly, patient would like to know if her post-op can be virtual as she cannot keep asking her grandson " for rides.     Will route to clinic to assist with questions. Patient understands a message will be sent to the clinic with her questions.      All patients questions were answered and patient was instructed to review surgical packet and call back with any questions or concerns.       Kari Santana on 8/6/2024 at 3:38 PM

## 2024-08-07 NOTE — TELEPHONE ENCOUNTER
[facility]: Surgical Oncology Cancer Care Short Note                                     Discussion with Patient:                                                         OUTBOUND CALL:     Spoke with Rosio following conversation with our OR  to answer questions and concerns.     Answered questions regarding clear margins following surgery and offered follow-up phone call with Dr. Gillis to discuss further. Rosio declined visit.     Discussed guidelines for rides to clinic and offered Social Work Referral. Rosio declined at this time.     Discussed scheduling post-op visit as a Video visit to help alleviate need for ride. Message sent to Dr. Gillis to confirm he is in agreement with this plan.     Message sent to Medical Oncology team, per Rosio's request, asking for scans and visit with Dr. Anna be rescheduled to early October since she now has a re-excision scheduled for 8/21. Message sent to Dr. Cervantes's RNCC regarding this request.     Encouraged Rosio to call with any further questions or concerns. Direct contact number provided.     Sonya Burciaga, RNCC  [facility]   Surgical Oncology       Approximately 15 minutes was spent in conversation with the patient/caregiver.

## 2024-08-19 ENCOUNTER — TELEPHONE (OUTPATIENT)
Dept: ONCOLOGY | Facility: CLINIC | Age: 79
End: 2024-08-19
Payer: COMMERCIAL

## 2024-08-19 NOTE — TELEPHONE ENCOUNTER
Patient called into the clinic asking for her arrival time for surgery. RN reached out to surgery scheduling to provide arrival time.    Called patient and LVM with arrival time but advised time could possibly change and she should receive a call from the pre-op nurses to provide arrival time.    Provided direct call back number [phone].    Kari Santana on 8/19/2024 at 10:59 AM

## 2024-08-20 ENCOUNTER — ANESTHESIA EVENT (OUTPATIENT)
Dept: SURGERY | Facility: CLINIC | Age: 79
End: 2024-08-20
Payer: MEDICARE

## 2024-08-20 ASSESSMENT — ENCOUNTER SYMPTOMS: SEIZURES: 0

## 2024-08-20 ASSESSMENT — LIFESTYLE VARIABLES: TOBACCO_USE: 0

## 2024-08-20 NOTE — ANESTHESIA PREPROCEDURE EVALUATION
Anesthesia Pre-Procedure Evaluation    Patient: Rosio Cunha   MRN: [id number] : 1945        Procedure : Procedure(s):  RE-EXCISION OF SHOULDER SARCOMA          Past Medical History:   Diagnosis Date     Leiomyosarcoma (H) 2024      Past Surgical History:   Procedure Laterality Date     EXCISE MASS UPPER EXTREMITY Right 7/15/2024    Procedure: WIDE EXCISION OF SHOULDER SARCOMA;  Surgeon: Moi Gillis MD;  Location: [facility] OR      No Known Allergies   Social History     Tobacco Use     Smoking status: Never     Passive exposure: Never     Smokeless tobacco: Never   Substance Use Topics     Alcohol use: Yes     Comment: holidays only      Wt Readings from Last 1 Encounters:   24 59.3 kg (130 lb 12.8 oz)        Anesthesia Evaluation   Pt has had prior anesthetic. Type: General.    No history of anesthetic complications       ROS/MED HX  ENT/Pulmonary:  - neg pulmonary ROS  (-) tobacco use, asthma, sleep apnea and recent URI   Neurologic:  - neg neurologic ROS  (-) no seizures and no CVA   Cardiovascular:     (+)  hypertension (lisinopril daily)- -   -  - -                                   Taking blood thinners: ASA 81 mg daily.   METS/Exercise Tolerance: >4 METS Comment: Walks 2-3 miles per day     Hematologic:  - neg hematologic  ROS  (-) history of blood clots and history of blood transfusion   Musculoskeletal:  - neg musculoskeletal ROS     GI/Hepatic:  - neg GI/hepatic ROS  (-) GERD and liver disease   Renal/Genitourinary:  - neg Renal ROS  (-) renal disease   Endo:  - neg endo ROS  (-) Type II DM   Psychiatric/Substance Use:  - neg psychiatric ROS     Infectious Disease:  - neg infectious disease ROS     Malignancy:   (+) Malignancy (Leimyosarcoma right shoulder/arm; s/p resection x1 now presenting for re-excision of positive margins),     Other:  - neg other ROS          Physical Exam    Airway        Mallampati: II    Neck ROM: limited     Respiratory Devices and Support         Dental      "  (+) Modest Abnormalities - crowns, retainers, 1 or 2 missing teeth      Cardiovascular   cardiovascular exam normal          Pulmonary   pulmonary exam normal            OUTSIDE LABS:  CBC:   Lab Results   Component Value Date    WBC 6.5 06/11/2024    HGB 12.5 06/11/2024    HCT 37.6 06/11/2024     06/11/2024     BMP:   Lab Results   Component Value Date     06/11/2024    POTASSIUM 4.1 06/11/2024    CHLORIDE 104 06/11/2024    CO2 26 06/11/2024    BUN 15.6 06/11/2024    CR 0.81 06/11/2024    CR 0.8 05/23/2024     (H) 06/11/2024    GLC 96 05/23/2024     COAGS: No results found for: \"PTT\", \"INR\", \"FIBR\"  POC: No results found for: \"BGM\", \"HCG\", \"HCGS\"  HEPATIC:   Lab Results   Component Value Date    ALBUMIN 4.9 06/11/2024    PROTTOTAL 7.2 06/11/2024    ALT 17 06/11/2024    AST 25 06/11/2024    ALKPHOS 78 06/11/2024    BILITOTAL 0.7 06/11/2024     OTHER:   Lab Results   Component Value Date    NIXON 10.1 06/11/2024       Anesthesia Plan    ASA Status:  2    NPO Status:  NPO Appropriate    Anesthesia Type: General.     - Airway: ETT   Induction: Intravenous, Propofol.   Maintenance: Balanced.   Techniques and Equipment:     - Lines/Monitors: 2nd IV, BIS     Consents    Anesthesia Plan(s) and associated risks, benefits, and realistic alternatives discussed. Questions answered and patient/representative(s) expressed understanding.     - Discussed: Risks, Benefits and Alternatives for BOTH SEDATION and the PROCEDURE were discussed     - Discussed with:  Patient       Use of blood products discussed: Yes.     - Discussed with: Patient.     - Consented: consented to blood products     Postoperative Care    Pain management: IV analgesics, Oral pain medications, Multi-modal analgesia.   PONV prophylaxis: Ondansetron (or other 5HT-3)     Comments:               Sarahi Iniguez MD    I have reviewed the pertinent notes and labs in the chart from the past 30 days and (re)examined the patient.  Any " updates or changes from those notes are reflected in this note.

## 2024-08-21 ENCOUNTER — ANESTHESIA (OUTPATIENT)
Dept: SURGERY | Facility: CLINIC | Age: 79
End: 2024-08-21
Payer: MEDICARE

## 2024-08-21 ENCOUNTER — HOSPITAL ENCOUNTER (OUTPATIENT)
Facility: CLINIC | Age: 79
Discharge: HOME | End: 2024-08-21
Attending: SURGERY | Admitting: SURGERY
Payer: MEDICARE

## 2024-08-21 VITALS
HEART RATE: 55 BPM | DIASTOLIC BLOOD PRESSURE: 72 MMHG | HEIGHT: 61 IN | RESPIRATION RATE: 18 BRPM | SYSTOLIC BLOOD PRESSURE: 146 MMHG | WEIGHT: 130.73 LBS | OXYGEN SATURATION: 94 % | TEMPERATURE: 96.6 F | BODY MASS INDEX: 24.68 KG/M2

## 2024-08-21 PROCEDURE — 250N000011 HC RX IP 250 OP 636: Performed by: SURGERY

## 2024-08-21 PROCEDURE — 250N000025 HC SEVOFLURANE, PER MIN: Performed by: SURGERY

## 2024-08-21 PROCEDURE — 710N000009 HC RECOVERY PHASE 1, LEVEL 1, PER MIN: Performed by: SURGERY

## 2024-08-21 PROCEDURE — 23073 EXC SHOULDER TUM DEEP 5 CM/>: CPT | Performed by: ANESTHESIOLOGY

## 2024-08-21 PROCEDURE — 250N000009 HC RX 250: Performed by: ANESTHESIOLOGY

## 2024-08-21 PROCEDURE — 710N000012 HC RECOVERY PHASE 2, PER MINUTE: Performed by: SURGERY

## 2024-08-21 PROCEDURE — 99100 ANES PT EXTEME AGE<1 YR&>70: CPT

## 2024-08-21 PROCEDURE — 360N000075 HC SURGERY LEVEL 2, PER MIN: Performed by: SURGERY

## 2024-08-21 PROCEDURE — 88307 TISSUE EXAM BY PATHOLOGIST: CPT | Mod: 26 | Performed by: PATHOLOGY

## 2024-08-21 PROCEDURE — 250N000011 HC RX IP 250 OP 636

## 2024-08-21 PROCEDURE — 250N000011 HC RX IP 250 OP 636: Performed by: ANESTHESIOLOGY

## 2024-08-21 PROCEDURE — 250N000013 HC RX MED GY IP 250 OP 250 PS 637

## 2024-08-21 PROCEDURE — 370N000017 HC ANESTHESIA TECHNICAL FEE, PER MIN: Performed by: SURGERY

## 2024-08-21 PROCEDURE — 99100 ANES PT EXTEME AGE<1 YR&>70: CPT | Performed by: ANESTHESIOLOGY

## 2024-08-21 PROCEDURE — 272N000001 HC OR GENERAL SUPPLY STERILE: Performed by: SURGERY

## 2024-08-21 PROCEDURE — 23073 EXC SHOULDER TUM DEEP 5 CM/>: CPT

## 2024-08-21 PROCEDURE — 23073 EXC SHOULDER TUM DEEP 5 CM/>: CPT | Mod: 58 | Performed by: SURGERY

## 2024-08-21 PROCEDURE — 999N000141 HC STATISTIC PRE-PROCEDURE NURSING ASSESSMENT: Performed by: SURGERY

## 2024-08-21 PROCEDURE — 88307 TISSUE EXAM BY PATHOLOGIST: CPT | Mod: TC | Performed by: SURGERY

## 2024-08-21 PROCEDURE — 258N000003 HC RX IP 258 OP 636

## 2024-08-21 RX ORDER — ONDANSETRON 4 MG/1
4 TABLET, ORALLY DISINTEGRATING ORAL EVERY 30 MIN PRN
Status: DISCONTINUED | OUTPATIENT
Start: 2024-08-21 | End: 2024-08-21 | Stop reason: HOSPADM

## 2024-08-21 RX ORDER — OXYCODONE HYDROCHLORIDE 5 MG/1
5 TABLET ORAL
Status: DISCONTINUED | OUTPATIENT
Start: 2024-08-21 | End: 2024-08-21 | Stop reason: HOSPADM

## 2024-08-21 RX ORDER — LIDOCAINE HYDROCHLORIDE 20 MG/ML
INJECTION, SOLUTION INFILTRATION; PERINEURAL PRN
Status: DISCONTINUED | OUTPATIENT
Start: 2024-08-21 | End: 2024-08-21

## 2024-08-21 RX ORDER — DEXAMETHASONE SODIUM PHOSPHATE 4 MG/ML
INJECTION, SOLUTION INTRA-ARTICULAR; INTRALESIONAL; INTRAMUSCULAR; INTRAVENOUS; SOFT TISSUE PRN
Status: DISCONTINUED | OUTPATIENT
Start: 2024-08-21 | End: 2024-08-21

## 2024-08-21 RX ORDER — SODIUM CHLORIDE, SODIUM LACTATE, POTASSIUM CHLORIDE, CALCIUM CHLORIDE 600; 310; 30; 20 MG/100ML; MG/100ML; MG/100ML; MG/100ML
INJECTION, SOLUTION INTRAVENOUS CONTINUOUS
Status: DISCONTINUED | OUTPATIENT
Start: 2024-08-21 | End: 2024-08-21 | Stop reason: HOSPADM

## 2024-08-21 RX ORDER — FENTANYL CITRATE 50 UG/ML
50 INJECTION, SOLUTION INTRAMUSCULAR; INTRAVENOUS EVERY 5 MIN PRN
Status: DISCONTINUED | OUTPATIENT
Start: 2024-08-21 | End: 2024-08-21 | Stop reason: HOSPADM

## 2024-08-21 RX ORDER — HYDROMORPHONE HCL IN WATER/PF 6 MG/30 ML
0.4 PATIENT CONTROLLED ANALGESIA SYRINGE INTRAVENOUS EVERY 5 MIN PRN
Status: DISCONTINUED | OUTPATIENT
Start: 2024-08-21 | End: 2024-08-21 | Stop reason: HOSPADM

## 2024-08-21 RX ORDER — ACETAMINOPHEN 325 MG/1
975 TABLET ORAL ONCE
Status: DISCONTINUED | OUTPATIENT
Start: 2024-08-21 | End: 2024-08-21 | Stop reason: HOSPADM

## 2024-08-21 RX ORDER — HYDROMORPHONE HCL IN WATER/PF 6 MG/30 ML
0.2 PATIENT CONTROLLED ANALGESIA SYRINGE INTRAVENOUS EVERY 5 MIN PRN
Status: DISCONTINUED | OUTPATIENT
Start: 2024-08-21 | End: 2024-08-21 | Stop reason: HOSPADM

## 2024-08-21 RX ORDER — LIDOCAINE 40 MG/G
CREAM TOPICAL
Status: DISCONTINUED | OUTPATIENT
Start: 2024-08-21 | End: 2024-08-21 | Stop reason: HOSPADM

## 2024-08-21 RX ORDER — EPHEDRINE SULFATE 50 MG/ML
INJECTION, SOLUTION INTRAMUSCULAR; INTRAVENOUS; SUBCUTANEOUS PRN
Status: DISCONTINUED | OUTPATIENT
Start: 2024-08-21 | End: 2024-08-21

## 2024-08-21 RX ORDER — FENTANYL CITRATE 50 UG/ML
INJECTION, SOLUTION INTRAMUSCULAR; INTRAVENOUS PRN
Status: DISCONTINUED | OUTPATIENT
Start: 2024-08-21 | End: 2024-08-21

## 2024-08-21 RX ORDER — OXYCODONE HYDROCHLORIDE 10 MG/1
10 TABLET ORAL
Status: DISCONTINUED | OUTPATIENT
Start: 2024-08-21 | End: 2024-08-21 | Stop reason: HOSPADM

## 2024-08-21 RX ORDER — FENTANYL CITRATE 50 UG/ML
25 INJECTION, SOLUTION INTRAMUSCULAR; INTRAVENOUS EVERY 5 MIN PRN
Status: DISCONTINUED | OUTPATIENT
Start: 2024-08-21 | End: 2024-08-21 | Stop reason: HOSPADM

## 2024-08-21 RX ORDER — DEXAMETHASONE SODIUM PHOSPHATE 4 MG/ML
4 INJECTION, SOLUTION INTRA-ARTICULAR; INTRALESIONAL; INTRAMUSCULAR; INTRAVENOUS; SOFT TISSUE
Status: DISCONTINUED | OUTPATIENT
Start: 2024-08-21 | End: 2024-08-21 | Stop reason: HOSPADM

## 2024-08-21 RX ORDER — BUPIVACAINE HYDROCHLORIDE 2.5 MG/ML
INJECTION, SOLUTION INFILTRATION; PERINEURAL PRN
Status: DISCONTINUED | OUTPATIENT
Start: 2024-08-21 | End: 2024-08-21 | Stop reason: HOSPADM

## 2024-08-21 RX ORDER — NALOXONE HYDROCHLORIDE 0.4 MG/ML
0.1 INJECTION, SOLUTION INTRAMUSCULAR; INTRAVENOUS; SUBCUTANEOUS
Status: DISCONTINUED | OUTPATIENT
Start: 2024-08-21 | End: 2024-08-21 | Stop reason: HOSPADM

## 2024-08-21 RX ORDER — ONDANSETRON 2 MG/ML
4 INJECTION INTRAMUSCULAR; INTRAVENOUS EVERY 30 MIN PRN
Status: DISCONTINUED | OUTPATIENT
Start: 2024-08-21 | End: 2024-08-21 | Stop reason: HOSPADM

## 2024-08-21 RX ORDER — ACETAMINOPHEN 325 MG/1
975 TABLET ORAL ONCE
Status: COMPLETED | OUTPATIENT
Start: 2024-08-21 | End: 2024-08-21

## 2024-08-21 RX ORDER — CEFAZOLIN SODIUM/WATER 2 G/20 ML
2 SYRINGE (ML) INTRAVENOUS
Status: COMPLETED | OUTPATIENT
Start: 2024-08-21 | End: 2024-08-21

## 2024-08-21 RX ORDER — ONDANSETRON 2 MG/ML
INJECTION INTRAMUSCULAR; INTRAVENOUS PRN
Status: DISCONTINUED | OUTPATIENT
Start: 2024-08-21 | End: 2024-08-21

## 2024-08-21 RX ORDER — PROPOFOL 10 MG/ML
INJECTION, EMULSION INTRAVENOUS PRN
Status: DISCONTINUED | OUTPATIENT
Start: 2024-08-21 | End: 2024-08-21

## 2024-08-21 RX ORDER — CEFAZOLIN SODIUM/WATER 2 G/20 ML
2 SYRINGE (ML) INTRAVENOUS SEE ADMIN INSTRUCTIONS
Status: DISCONTINUED | OUTPATIENT
Start: 2024-08-21 | End: 2024-08-21 | Stop reason: HOSPADM

## 2024-08-21 RX ORDER — LABETALOL HYDROCHLORIDE 5 MG/ML
10 INJECTION, SOLUTION INTRAVENOUS
Status: DISCONTINUED | OUTPATIENT
Start: 2024-08-21 | End: 2024-08-21 | Stop reason: HOSPADM

## 2024-08-21 RX ORDER — ENOXAPARIN SODIUM 100 MG/ML
40 INJECTION SUBCUTANEOUS
Status: COMPLETED | OUTPATIENT
Start: 2024-08-21 | End: 2024-08-21

## 2024-08-21 RX ORDER — ACETAMINOPHEN 325 MG/1
650 TABLET ORAL
Status: DISCONTINUED | OUTPATIENT
Start: 2024-08-21 | End: 2024-08-21 | Stop reason: HOSPADM

## 2024-08-21 RX ADMIN — PROPOFOL 200 MG: 10 INJECTION, EMULSION INTRAVENOUS at 14:04

## 2024-08-21 RX ADMIN — PROPOFOL 100 MG: 10 INJECTION, EMULSION INTRAVENOUS at 14:35

## 2024-08-21 RX ADMIN — Medication 2 G: at 14:09

## 2024-08-21 RX ADMIN — ONDANSETRON 4 MG: 2 INJECTION INTRAMUSCULAR; INTRAVENOUS at 14:54

## 2024-08-21 RX ADMIN — DEXAMETHASONE SODIUM PHOSPHATE 4 MG: 4 INJECTION, SOLUTION INTRA-ARTICULAR; INTRALESIONAL; INTRAMUSCULAR; INTRAVENOUS; SOFT TISSUE at 14:04

## 2024-08-21 RX ADMIN — SODIUM CHLORIDE, POTASSIUM CHLORIDE, SODIUM LACTATE AND CALCIUM CHLORIDE: 600; 310; 30; 20 INJECTION, SOLUTION INTRAVENOUS at 13:58

## 2024-08-21 RX ADMIN — LIDOCAINE HYDROCHLORIDE 100 MG: 20 INJECTION, SOLUTION INFILTRATION; PERINEURAL at 14:04

## 2024-08-21 RX ADMIN — MIDAZOLAM 2 MG: 1 INJECTION INTRAMUSCULAR; INTRAVENOUS at 14:11

## 2024-08-21 RX ADMIN — ENOXAPARIN SODIUM 40 MG: 40 INJECTION SUBCUTANEOUS at 11:19

## 2024-08-21 RX ADMIN — EPHEDRINE SULFATE 10 MG: 5 INJECTION INTRAVENOUS at 14:35

## 2024-08-21 RX ADMIN — SODIUM CHLORIDE, POTASSIUM CHLORIDE, SODIUM LACTATE AND CALCIUM CHLORIDE: 600; 310; 30; 20 INJECTION, SOLUTION INTRAVENOUS at 14:56

## 2024-08-21 RX ADMIN — ACETAMINOPHEN 975 MG: 325 TABLET ORAL at 11:19

## 2024-08-21 RX ADMIN — PROPOFOL 100 MG: 10 INJECTION, EMULSION INTRAVENOUS at 14:06

## 2024-08-21 RX ADMIN — FENTANYL CITRATE 50 MCG: 50 INJECTION INTRAMUSCULAR; INTRAVENOUS at 14:04

## 2024-08-21 ASSESSMENT — ACTIVITIES OF DAILY LIVING (ADL)
ADLS_ACUITY_SCORE: 31
ADLS_ACUITY_SCORE: 32
ADLS_ACUITY_SCORE: 31

## 2024-08-21 NOTE — OP NOTE
Preoperative Diagnosis: Sarcoma right shoulder    Postoperative Diagnosis: Same    Procedure: Reexcision of right shoulder margin, defect size equals 2 x 9.5 cm    Surgeons: Dr. Moi Gillis    Anesthesia: General    Indications for Surgery: The patient is a 79-year-old woman who underwent an excision of her right shoulder sarcoma.  She had a positive margin at the 12 to 3 o'clock position.  She now presents for reexcision.    Procedure in Detail: After informed consent the patient was brought the operating room and given a general anesthetic.  She was placed in lateral decubitus position.  She was prepped and draped in usual fashion.  I administered local anesthetic to the right shoulder.  I drew out a 2 cm margin around the 12 to 3 o'clock position.  A skin incision was made with a scalpel.  The Bovie cautery was used to incise subcutaneous tissues.  Dissection went down through the dermis, subcutaneous tissues, and of the muscle.  The specimen was removed oriented and sent to pathology.  Strict hemostasis was obtained with the Bovie cautery.  Medial and lateral flaps were raised to facilitate closure.  The dermis was closed with interrupted 3-0 Vicryl suture for the dermal deep dermal layer and a running 4-0 PDS subcuticular stitch for the skin.  And I reinforced the closure with interrupted 3-0 nylon sutures in a horizontal mattress fashion.  Dermabond was placed and the patient was taken to the recovery room in stable condition.        Moi Gillis MD, MS  Surgical Oncology

## 2024-08-21 NOTE — DISCHARGE INSTRUCTIONS
Contacting your Doctor -   To contact a doctor, call Dr Gillis's office at [phone] or:  [phone] and ask for the resident on call for Surgical Oncology (answered 24 hours a day)   Emergency Department:  [facility]: [phone]  [facility]: [phone] 911 if you are in need of immediate or emergent help

## 2024-08-21 NOTE — ANESTHESIA CARE TRANSFER NOTE
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    Anesthesia Type:   General     Note:    Oropharynx: oropharynx clear of all foreign objects and spontaneously breathing  Level of Consciousness: awake  Oxygen Supplementation: face mask  Level of Supplemental Oxygen (L/min / FiO2): 6  Independent Airway: airway patency satisfactory and stable  Dentition: dentition unchanged  Vital Signs Stable: post-procedure vital signs reviewed and stable  Report to RN Given: handoff report given  Patient transferred to: PACU    Handoff Report: Identifed the Patient, Identified the Reponsible Provider, Reviewed the pertinent medical history, Discussed the surgical course, Reviewed Intra-OP anesthesia mangement and issues during anesthesia, Set expectations for post-procedure period and Allowed opportunity for questions and acknowledgement of understanding      Vitals:  Vitals Value Taken Time   /73    Temp 36.2    Pulse 75 08/21/24 1528   Resp 19 08/21/24 1528   SpO2 100 % 08/21/24 1528   Vitals shown include unfiled device data.    Electronically Signed By: MICKI BOSTON CRNA  August 21, 2024  3:28 PM

## 2024-08-21 NOTE — ANESTHESIA PROCEDURE NOTES
Airway       Patient location during procedure: OR  Staff -        Resident/Fellow: Ana Maria Mark Hawk       Performed By: SRNA  Consent for Airway        Urgency: elective  Indications and Patient Condition       Indications for airway management: cristo-procedural       Induction type:intravenous       Mask difficulty assessment: 1 - vent by mask    Final Airway Details       Final airway type: supraglottic airway    Supraglottic Airway Details        Type: LMA       Brand: LMA Unique       LMA size: 4    Post intubation assessment        Placement verified by: capnometry and chest rise        Number of attempts at approach: 3       Secured with: tape       Ease of procedure: easy       Dentition: Intact and Unchanged    Additional Comments       First 2 attempts not deep enough, fighting LMA placement. 3rd attempt patient deepened and able to easily place.

## 2024-08-21 NOTE — H&P
[facility]    History and Physical - Surgical Oncology Service       Date of Admission:  8/21/2024    Assessment & Plan: Surgery   Rosio Cunha is a 79 year old female with PMH leiomyosarcoma previously resected in July 2024. Pathology report demonstrated positive margins and thus she is presenting to hospital today for elective resection of her leiomyosarcoma.    She is feeling well. Denies cp, palpitations, sob, fever, chills, paraesthesias, or weakness. She understands the operative plan for today. All questions were answered. She is ready to go through with surgery.          Discussed with staff, Dr Gillis.    Diamond Quispe MD PGY3  [facility]  Non-urgent messages: Securely message with Vocera (more info)  Text page via [employer]       Past Medical History    Past Medical History:   Diagnosis Date    Leiomyosarcoma (H) 06/2024       Past Surgical History   Past Surgical History:   Procedure Laterality Date    EXCISE MASS UPPER EXTREMITY Right 7/15/2024    Procedure: WIDE EXCISION OF SHOULDER SARCOMA;  Surgeon: Moi Gillis MD;  [place] [facility]       Prior to Admission Medications   Prior to Admission Medications   Prescriptions Last Dose Informant Patient Reported? Taking?   acetaminophen (TYLENOL) 325 MG tablet   No No   Sig: Take 2 tablets (650 mg) by mouth every 4 hours as needed for mild pain   aspirin 81 MG EC tablet 8/20/2024 at 0800  Yes Yes   Sig: Take 81 mg by mouth every morning   lisinopril (ZESTRIL) 20 MG tablet 8/20/2024 at 0800  Yes Yes   Sig: Take 1 tablet by mouth every morning   methocarbamol (ROBAXIN) 500 MG tablet   No No   Sig: Take 1 tablet (500 mg) by mouth 4 times daily as needed for muscle spasms   oxyCODONE (ROXICODONE) 5 MG tablet   No No   Sig: Take 1-2 tablets (5-10 mg) by mouth every 4 hours as needed for moderate to severe pain   polyethylene glycol  (MIRALAX) 17 GM/Dose powder   No No   Sig: Take 17 g by mouth daily Take while taking narcotic pain meds to prevent constipation      Facility-Administered Medications: None        Review of Systems    The 10 point Review of Systems is negative other than noted in the HPI or here.     Social History   I have reviewed this patient's social history and updated it with pertinent information if needed.  Social History     Tobacco Use    Smoking status: Never     Passive exposure: Never    Smokeless tobacco: Never   Substance Use Topics    Alcohol use: Yes     Comment: holidays only    Drug use: Never         Allergies   No Known Allergies     Physical Exam   Vital Signs: Temp: 97.8  F (36.6  C) Temp src: Oral BP: 136/74 Pulse: 66   Resp: 16 SpO2: 100 % O2 Device: None (Room air)    Weight: 130 lbs 11.72 ozNo intake or output data in the 24 hours ending 08/21/24 1349  General Appearance: Laying in bed, NAD  RUE: well healed linear incision on posterolateral right shoulder, no masses appreciated  Respiratory: NLB on RA  Cardiovascular: RRR on radial palpation  GI: soft, ND, NT

## 2024-08-28 LAB
PATH REPORT.COMMENTS IMP SPEC: NORMAL
PATH REPORT.COMMENTS IMP SPEC: NORMAL
PATH REPORT.FINAL DX SPEC: NORMAL
PATH REPORT.GROSS SPEC: NORMAL
PATH REPORT.MICROSCOPIC SPEC OTHER STN: NORMAL
PATH REPORT.RELEVANT HX SPEC: NORMAL
PHOTO IMAGE: NORMAL

## 2024-08-29 ENCOUNTER — VIRTUAL VISIT (OUTPATIENT)
Dept: ONCOLOGY | Facility: CLINIC | Age: 79
End: 2024-08-29
Attending: SURGERY
Payer: COMMERCIAL

## 2024-08-29 VITALS — HEIGHT: 62 IN | WEIGHT: 128 LBS | BODY MASS INDEX: 23.55 KG/M2

## 2024-08-29 DIAGNOSIS — C49.12 MALIGNANT NEOPLASM OF CONNECTIVE AND SOFT TISSUE OF LEFT UPPER LIMB, INCLUDING SHOULDER (H): Primary | ICD-10-CM

## 2024-08-29 ASSESSMENT — PAIN SCALES - GENERAL: PAINLEVEL: NO PAIN (0)

## 2024-08-29 NOTE — PROGRESS NOTES
Today had a phone visit with Rosio after she underwent a reexcision of her sarcoma of her shoulder on 821.  She is doing well with minimal side effects.  Her pathology report revealed no residual sarcoma.    Impression: Postop check    Plan: I recommended the radiation therapy consultation which she declined.  I wanted her to get her nylon sutures out in the next couple of weeks.  She will arrange for this closer to home.  I will see her in the future if any problems arise.  The phone visit lasted 7 minutes.

## 2024-08-29 NOTE — LETTER
8/29/2024      Rosio Cunha  [address]  [facility] [address]      Dear Colleague,    Thank you for referring your patient, Rosio Cunha, to the [facility]. Please see a copy of my visit note below.    Virtual Visit Details    Today had a phone visit with Rosio after she underwent a reexcision of her sarcoma of her shoulder on 821.  She is doing well with minimal side effects.  Her pathology report revealed no residual sarcoma.    Impression: Postop check    Plan: I recommended the radiation therapy consultation which she declined.  I wanted her to get her nylon sutures out in the next couple of weeks.  She will arrange for this closer to home.  I will see her in the future if any problems arise.  The phone visit lasted 7 minutes.      Again, thank you for allowing me to participate in the care of your patient.        Sincerely,        Moi Gillis MD

## 2024-08-29 NOTE — NURSING NOTE
Current patient location: [address]  [facility] [address]    Is the patient currently in the state of [place]? NO    Visit mode:TELEPHONE    If the visit is dropped, the patient can be reconnected by: TELEPHONE VISIT: Phone number:   Telephone Information:   Mobile [phone]       Will anyone else be joining the visit? NO  (If patient encounters technical issues they should call [phone]    How would you like to obtain your AVS? MyChart    Are changes needed to the allergy or medication list? Pt stated no changes to allergies and Pt stated no med changes    Are refills needed on medications prescribed by this physician? NO    Rooming Documentation:  Unable to complete questionnaire(s) due to time      Reason for visit: RECHECK    Leta Hairston VVF

## 2025-08-02 ENCOUNTER — TELEPHONE (OUTPATIENT)
Dept: ORTHOPEDICS | Facility: CLINIC | Age: 80
End: 2025-08-02
Payer: COMMERCIAL

## 2025-08-07 ENCOUNTER — PRE VISIT (OUTPATIENT)
Dept: ORTHOPEDICS | Facility: CLINIC | Age: 80
End: 2025-08-07

## 2025-08-10 ENCOUNTER — HEALTH MAINTENANCE LETTER (OUTPATIENT)
Age: 80
End: 2025-08-10

## 2025-08-12 ENCOUNTER — TELEPHONE (OUTPATIENT)
Dept: ORTHOPEDICS | Facility: CLINIC | Age: 80
End: 2025-08-12
Payer: COMMERCIAL

## 2025-08-22 ENCOUNTER — LAB REQUISITION (OUTPATIENT)
Dept: LAB | Facility: CLINIC | Age: 80
End: 2025-08-22
Payer: COMMERCIAL

## 2025-08-22 ENCOUNTER — TELEPHONE (OUTPATIENT)
Dept: ORTHOPEDICS | Facility: CLINIC | Age: 80
End: 2025-08-22

## 2025-08-22 ENCOUNTER — PRE VISIT (OUTPATIENT)
Dept: ORTHOPEDICS | Facility: CLINIC | Age: 80
End: 2025-08-22

## (undated) DEVICE — SU SILK 4-0 TIE 12X30" A303H

## (undated) DEVICE — LINEN TOWEL PACK X5 5464

## (undated) DEVICE — SUCTION MANIFOLD NEPTUNE 2 SYS 4 PORT 0702-020-000

## (undated) DEVICE — SU DERMABOND ADVANCED .7ML DNX12

## (undated) DEVICE — SUCTION MANIFOLD NEPTUNE 2 SYS 1 PORT 702-025-000

## (undated) DEVICE — Device

## (undated) DEVICE — SOL NACL 0.9% IRRIG 500ML BOTTLE 2F7123

## (undated) DEVICE — SU VICRYL+ 3-0 27IN SH UND VCP416H

## (undated) DEVICE — GLOVE BIOGEL PI MICRO SZ 8.0 48580

## (undated) DEVICE — DRSG PRIMAPORE 02X3" 7133

## (undated) DEVICE — DRAPE SHEET REV FOLD 3/4 9349

## (undated) DEVICE — SOL WATER IRRIG 1000ML BOTTLE 2F7114

## (undated) DEVICE — SOL NACL 0.9% IRRIG 1000ML BOTTLE 2F7124

## (undated) DEVICE — ESU GROUND PAD ADULT W/CORD E7507

## (undated) DEVICE — GLOVE BIOGEL PI ULTRATOUCH SZ 8.0 41180

## (undated) DEVICE — SUCTION MINISQUAIR SMOKE EVAC CAPTURE DEVICE SQ20012-01

## (undated) DEVICE — PREP CHLORAPREP 26ML TINTED HI-LITE ORANGE 930815

## (undated) DEVICE — LINEN TOWEL PACK X6 WHITE 5487

## (undated) DEVICE — SU ETHILON 3-0 PS-1 18" 1663H

## (undated) DEVICE — SU PDS II 4-0 FS-2 27" Z422H

## (undated) DEVICE — SU MONOCRYL 4-0 PS-2 27" UND Y426H

## (undated) DEVICE — SU VICRYL 3-0 SH 27" J316H

## (undated) DEVICE — PACK MINOR CUSTOM ASC

## (undated) DEVICE — DRAPE EXTREMITY W/ARMBOARD 29405

## (undated) DEVICE — ESU ELEC BLADE 2.75" COATED/INSULATED E1455

## (undated) DEVICE — SU PDS II 2-0 SH 27" Z317H

## (undated) RX ORDER — CEFAZOLIN SODIUM/WATER 2 G/20 ML
SYRINGE (ML) INTRAVENOUS
Status: DISPENSED
Start: 2024-08-21

## (undated) RX ORDER — BUPIVACAINE HYDROCHLORIDE 2.5 MG/ML
INJECTION, SOLUTION EPIDURAL; INFILTRATION; INTRACAUDAL
Status: DISPENSED
Start: 2024-07-15

## (undated) RX ORDER — PROPOFOL 10 MG/ML
INJECTION, EMULSION INTRAVENOUS
Status: DISPENSED
Start: 2024-07-15

## (undated) RX ORDER — PROPOFOL 10 MG/ML
INJECTION, EMULSION INTRAVENOUS
Status: DISPENSED
Start: 2024-08-21

## (undated) RX ORDER — ONDANSETRON 2 MG/ML
INJECTION INTRAMUSCULAR; INTRAVENOUS
Status: DISPENSED
Start: 2024-08-21

## (undated) RX ORDER — ACETAMINOPHEN 325 MG/1
TABLET ORAL
Status: DISPENSED
Start: 2024-07-15

## (undated) RX ORDER — ENOXAPARIN SODIUM 100 MG/ML
INJECTION SUBCUTANEOUS
Status: DISPENSED
Start: 2024-08-21

## (undated) RX ORDER — ONDANSETRON 2 MG/ML
INJECTION INTRAMUSCULAR; INTRAVENOUS
Status: DISPENSED
Start: 2024-07-15

## (undated) RX ORDER — KETOROLAC TROMETHAMINE 30 MG/ML
INJECTION, SOLUTION INTRAMUSCULAR; INTRAVENOUS
Status: DISPENSED
Start: 2024-07-15

## (undated) RX ORDER — ENOXAPARIN SODIUM 100 MG/ML
INJECTION SUBCUTANEOUS
Status: DISPENSED
Start: 2024-07-15

## (undated) RX ORDER — LIDOCAINE HYDROCHLORIDE AND EPINEPHRINE 10; 10 MG/ML; UG/ML
INJECTION, SOLUTION INFILTRATION; PERINEURAL
Status: DISPENSED
Start: 2024-07-15

## (undated) RX ORDER — EPINEPHRINE 1 MG/ML
INJECTION, SOLUTION INTRAMUSCULAR; SUBCUTANEOUS
Status: DISPENSED
Start: 2024-07-15

## (undated) RX ORDER — BUPIVACAINE HYDROCHLORIDE 2.5 MG/ML
INJECTION, SOLUTION EPIDURAL; INFILTRATION; INTRACAUDAL
Status: DISPENSED
Start: 2024-08-21

## (undated) RX ORDER — FENTANYL CITRATE 50 UG/ML
INJECTION, SOLUTION INTRAMUSCULAR; INTRAVENOUS
Status: DISPENSED
Start: 2024-08-21

## (undated) RX ORDER — ACETAMINOPHEN 325 MG/1
TABLET ORAL
Status: DISPENSED
Start: 2024-08-21

## (undated) RX ORDER — CEFAZOLIN SODIUM 2 G/50ML
SOLUTION INTRAVENOUS
Status: DISPENSED
Start: 2024-07-15

## (undated) RX ORDER — FENTANYL CITRATE 50 UG/ML
INJECTION, SOLUTION INTRAMUSCULAR; INTRAVENOUS
Status: DISPENSED
Start: 2024-07-15

## (undated) RX ORDER — EPHEDRINE SULFATE 50 MG/ML
INJECTION, SOLUTION INTRAMUSCULAR; INTRAVENOUS; SUBCUTANEOUS
Status: DISPENSED
Start: 2024-08-21

## (undated) RX ORDER — DEXAMETHASONE SODIUM PHOSPHATE 4 MG/ML
INJECTION, SOLUTION INTRA-ARTICULAR; INTRALESIONAL; INTRAMUSCULAR; INTRAVENOUS; SOFT TISSUE
Status: DISPENSED
Start: 2024-07-15

## (undated) RX ORDER — LIDOCAINE HYDROCHLORIDE 10 MG/ML
INJECTION, SOLUTION EPIDURAL; INFILTRATION; INTRACAUDAL; PERINEURAL
Status: DISPENSED
Start: 2024-07-15